# Patient Record
Sex: FEMALE | Race: WHITE | NOT HISPANIC OR LATINO | Employment: FULL TIME | URBAN - METROPOLITAN AREA
[De-identification: names, ages, dates, MRNs, and addresses within clinical notes are randomized per-mention and may not be internally consistent; named-entity substitution may affect disease eponyms.]

---

## 2020-07-15 ENCOUNTER — TELEMEDICINE CONVERTED (OUTPATIENT)
Dept: GASTROENTEROLOGY | Facility: CLINIC | Age: 35
End: 2020-07-15
Attending: PHYSICIAN ASSISTANT

## 2020-08-13 ENCOUNTER — HOSPITAL ENCOUNTER (OUTPATIENT)
Dept: PREADMISSION TESTING | Facility: HOSPITAL | Age: 35
Discharge: HOME | End: 2020-08-13
Attending: INTERNAL MEDICINE

## 2020-08-15 LAB — SARS-COV-2 RNA SPEC QL NAA+PROBE: NOT DETECTED

## 2020-08-17 ENCOUNTER — HOSPITAL ENCOUNTER (OUTPATIENT)
Dept: GASTROENTEROLOGY | Facility: HOSPITAL | Age: 35
Setting detail: HOSPITAL OUTPATIENT SURGERY
Discharge: HOME | End: 2020-08-17
Attending: INTERNAL MEDICINE

## 2020-08-17 LAB — HCG UR QL: NEGATIVE

## 2021-05-13 NOTE — PROGRESS NOTES
Progress Note      Patient Name: Mary Patel   Patient ID: [id number]   Sex: Female   YOB: 1985    Referring Provider: Margaret Laughlin MD    Visit Date: July 15, 2020    Provider: Damon Cummings PA-C   Location: [facility]   Location Address: [address] [place] [address]  [id number]   Location Phone: [phone]          History Of Present Illness  TELEHEALTH TELEPHONE VISIT  Chief Complaint: rectal bleeding   Mary Patel is a 35 year old female who is presenting for evaluation via telehealth telephone visit. Verbal consent obtained before beginning visit.   Provider spent 11 minutes with the patient during the telehealth visit.   The following staff were present during this visit: Antelmo Kapoor MA   Past Medical History/ Overview of Patient Symptoms       35-year-old female requested a telehealth video call through [facility] for rectal bleeding.  This is occurred for several years, with worsening symptoms in the last couple months.  She has bleeding with each bowel movements.  The bleeding is has bright red blood that fills the toilet bowl.  She has not tried any over-the-counter medications for symptoms.  She denies a previous colonoscopy.  She did have an EGD in  by Dr. Melendez showed H. pylori gastritis.       Past Medical History  Reviewed None Changed         Past Surgical History  ; Cholecystectomy; EGD         Medication List  Claritin 10 mg oral tablet; Proctozone-HC 2.5 % topical cream with perineal applicator         Allergy List  NO KNOWN DRUG ALLERGIES       Allergies Reconciled  Family Medical History  Family history of colon cancer         Social History  Tobacco (Current every day)         Immunizations  Reviewed None Changed         Review of Systems  · Constitutional  o Denies  o : fatigue, night sweats  · Eyes  o Denies  o : double vision, blurred vision  · HENT  o Denies  o : vertigo, recent head injury  · Breasts  o Denies  o : abnormal changes in  breast size, additional breast symptoms except as noted in the HPI  · Cardiovascular  o Denies  o : chest pain, irregular heart beats  · Respiratory  o Denies  o : shortness of breath, productive cough  · Gastrointestinal  o Denies  o : nausea, vomiting  · Genitourinary  o Denies  o : dysuria, urinary retention  · Integument  o Denies  o : hair growth change, new skin lesions  · Neurologic  o Denies  o : altered mental status, seizures  · Musculoskeletal  o Denies  o : joint swelling, limitation of motion  · Endocrine  o Denies  o : cold intolerance, heat intolerance  · Heme-Lymph  o Denies  o : petechiae, lymph node enlargement or tenderness  · Allergic-Immunologic  o Denies  o : frequent illnesses      Physical Examination     Hair normal, eyebrows normal, no nasal deformities, no lip deformities, no facial skin deformities, trachea midline, breathing unlabored, patient no apparent distress, alert and oriented, sclera anicteric.           Assessment  · Rectal bleeding     569.3/K62.5      Plan  · Orders  o CBC with [facility] ([id number]) - 569.3/K62.5 - 07/15/2020  · Medications  o Medications have been Reconciled  o Transition of Care or Provider Policy  · Instructions  o Plan Of Care: 35-year-old female with rectal bleeding and has previously never had a colonoscopy. I will schedule her for colonoscopy to evaluate for malignancy, hemorrhoids, AVMs, IBD, etc. I will add a trial of proctozone HC 2.5% in the interim. The patient is aware she is been tested for the coronavirus days prior to the procedure. I will order a CBC and she will call for results.            Electronically Signed by: Damon Cummings PA-C -Author on July 15, 2020 10:18:33 AM  Electronically Co-signed by: Jose Carlos Singh MD -Reviewer on July 20, 2020 09:11:49 PM

## 2021-08-11 ENCOUNTER — PREP FOR SURGERY (OUTPATIENT)
Dept: OTHER | Facility: HOSPITAL | Age: 36
End: 2021-08-11

## 2021-08-11 DIAGNOSIS — Z30.09 UNWANTED FERTILITY: ICD-10-CM

## 2021-08-11 DIAGNOSIS — N93.9 ABNORMAL UTERINE BLEEDING (AUB): Primary | ICD-10-CM

## 2021-08-11 RX ORDER — SODIUM CHLORIDE 0.9 % (FLUSH) 0.9 %
10 SYRINGE (ML) INJECTION AS NEEDED
Status: CANCELLED | OUTPATIENT
Start: 2021-08-11

## 2021-08-11 RX ORDER — SODIUM CHLORIDE 0.9 % (FLUSH) 0.9 %
3 SYRINGE (ML) INJECTION EVERY 12 HOURS SCHEDULED
Status: CANCELLED | OUTPATIENT
Start: 2021-08-11

## 2021-08-11 RX ORDER — SODIUM CHLORIDE, SODIUM LACTATE, POTASSIUM CHLORIDE, CALCIUM CHLORIDE 600; 310; 30; 20 MG/100ML; MG/100ML; MG/100ML; MG/100ML
125 INJECTION, SOLUTION INTRAVENOUS CONTINUOUS
Status: CANCELLED | OUTPATIENT
Start: 2021-08-11

## 2021-08-11 RX ORDER — ONDANSETRON 2 MG/ML
4 INJECTION INTRAMUSCULAR; INTRAVENOUS EVERY 6 HOURS PRN
Status: CANCELLED | OUTPATIENT
Start: 2021-08-11

## 2021-08-11 RX ORDER — CEFAZOLIN SODIUM 2 G/100ML
2 INJECTION, SOLUTION INTRAVENOUS ONCE
Status: CANCELLED | OUTPATIENT
Start: 2021-08-11 | End: 2021-08-11

## 2021-08-16 ENCOUNTER — PREP FOR SURGERY (OUTPATIENT)
Dept: OTHER | Facility: HOSPITAL | Age: 36
End: 2021-08-16

## 2021-08-20 ENCOUNTER — PRE-ADMISSION TESTING (OUTPATIENT)
Dept: PREADMISSION TESTING | Facility: HOSPITAL | Age: 36
End: 2021-08-20

## 2021-08-20 ENCOUNTER — LAB (OUTPATIENT)
Dept: LAB | Facility: HOSPITAL | Age: 36
End: 2021-08-20

## 2021-08-20 VITALS
RESPIRATION RATE: 16 BRPM | WEIGHT: 142.42 LBS | BODY MASS INDEX: 24.31 KG/M2 | HEART RATE: 66 BPM | HEIGHT: 64 IN | OXYGEN SATURATION: 95 % | TEMPERATURE: 98.4 F

## 2021-08-20 DIAGNOSIS — N93.9 ABNORMAL UTERINE BLEEDING: Primary | ICD-10-CM

## 2021-08-20 DIAGNOSIS — N93.9 ABNORMAL UTERINE BLEEDING (AUB): ICD-10-CM

## 2021-08-20 DIAGNOSIS — Z30.09 UNWANTED FERTILITY: ICD-10-CM

## 2021-08-20 LAB
ABO GROUP BLD: NORMAL
BASOPHILS # BLD AUTO: 0.04 10*3/MM3 (ref 0–0.2)
BASOPHILS NFR BLD AUTO: 0.6 % (ref 0–1.5)
DEPRECATED RDW RBC AUTO: 41.7 FL (ref 37–54)
EOSINOPHIL # BLD AUTO: 0.36 10*3/MM3 (ref 0–0.4)
EOSINOPHIL NFR BLD AUTO: 5.3 % (ref 0.3–6.2)
ERYTHROCYTE [DISTWIDTH] IN BLOOD BY AUTOMATED COUNT: 12 % (ref 12.3–15.4)
HCG INTACT+B SERPL-ACNC: <0.5 MIU/ML
HCT VFR BLD AUTO: 39 % (ref 34–46.6)
HGB BLD-MCNC: 13.3 G/DL (ref 12–15.9)
IMM GRANULOCYTES # BLD AUTO: 0.03 10*3/MM3 (ref 0–0.05)
IMM GRANULOCYTES NFR BLD AUTO: 0.4 % (ref 0–0.5)
LYMPHOCYTES # BLD AUTO: 1.7 10*3/MM3 (ref 0.7–3.1)
LYMPHOCYTES NFR BLD AUTO: 25.3 % (ref 19.6–45.3)
MCH RBC QN AUTO: 32 PG (ref 26.6–33)
MCHC RBC AUTO-ENTMCNC: 34.1 G/DL (ref 31.5–35.7)
MCV RBC AUTO: 93.8 FL (ref 79–97)
MONOCYTES # BLD AUTO: 0.68 10*3/MM3 (ref 0.1–0.9)
MONOCYTES NFR BLD AUTO: 10.1 % (ref 5–12)
NEUTROPHILS NFR BLD AUTO: 3.92 10*3/MM3 (ref 1.7–7)
NEUTROPHILS NFR BLD AUTO: 58.3 % (ref 42.7–76)
NRBC BLD AUTO-RTO: 0 /100 WBC (ref 0–0.2)
PLATELET # BLD AUTO: 265 10*3/MM3 (ref 140–450)
PMV BLD AUTO: 9.4 FL (ref 6–12)
RBC # BLD AUTO: 4.16 10*6/MM3 (ref 3.77–5.28)
RH BLD: POSITIVE
WBC # BLD AUTO: 6.73 10*3/MM3 (ref 3.4–10.8)

## 2021-08-20 PROCEDURE — 84702 CHORIONIC GONADOTROPIN TEST: CPT

## 2021-08-20 PROCEDURE — 36415 COLL VENOUS BLD VENIPUNCTURE: CPT

## 2021-08-20 PROCEDURE — 85025 COMPLETE CBC W/AUTO DIFF WBC: CPT

## 2021-08-20 PROCEDURE — U0003 INFECTIOUS AGENT DETECTION BY NUCLEIC ACID (DNA OR RNA); SEVERE ACUTE RESPIRATORY SYNDROME CORONAVIRUS 2 (SARS-COV-2) (CORONAVIRUS DISEASE [COVID-19]), AMPLIFIED PROBE TECHNIQUE, MAKING USE OF HIGH THROUGHPUT TECHNOLOGIES AS DESCRIBED BY CMS-2020-01-R: HCPCS

## 2021-08-20 PROCEDURE — 86900 BLOOD TYPING SEROLOGIC ABO: CPT

## 2021-08-20 PROCEDURE — 86901 BLOOD TYPING SEROLOGIC RH(D): CPT

## 2021-08-20 PROCEDURE — C9803 HOPD COVID-19 SPEC COLLECT: HCPCS

## 2021-08-20 NOTE — DISCHARGE INSTRUCTIONS
IMPORTANT INSTRUCTIONS - PRE-ADMISSION TESTING  1. DO NOT EAT OR CHEW anything after midnight the night before your procedure.    2. You may have CLEAR liquids up to _2_____ hours prior to ARRIVAL time.   3. Take the following medications the morning of your procedure with JUST A SIP OF WATER:  __________________________ALLERGY MED_____________________________________________________________________________________________________________________________________________________________    4. DO NOT BRING your medications to the hospital with you, UNLESS something has changed since your PRE-Admission Testing appointment.  5. Hold all vitamins, supplements, and NSAIDS (Non- steroidal anti-inflammatory meds) for one week prior to surgery (you MAY take Tylenol or Acetaminophen).  6. If you are diabetic, check your blood sugar the morning of your procedure. If it is less than 70 or if you are feeling symptomatic, call the following number for further instructions: [phone] SAME DAY SURGERY WILL CALL ARRIVAL TIME_____.  7. Use your inhalers/nebulizers as usual, the morning of your procedure. BRING YOUR INHALERS with you.NA   8. Bring your CPAP or BIPAP to hospital, ONLY IF YOU WILL BE SPENDING THE NIGHT. NA  9. Make sure you have a ride home and have someone who will stay with you the day of your procedure after you go home.  10. If you have any questions, please call your Pre-Admission Testing Nurse, ____________SELINA____ at [phone]__.   11. Per anesthesia request, do not smoke for 24 hours before your procedure or as instructed by your surgeon. !!!!!!!!  PREOPERATIVE (BEFORE SURGERY)              BATHING INSTRUCTIONS  Instructions:   • You will need to shower 1 time utilizing the soap provided; at the times indicated   below:     - AM  8/26 OR PM 8/25         • Wash your hair and face with normal shampoo and soap, rinse it well before using the surgical soap.     • In the shower, wet the skin  completely with water from your neck to your feet. Apply the cleanser to your   body ONLY FROM THE NECK TO YOUR FEET.    • Do NOT USE THE CLEANSER ON YOUR FACE, HEAD, OR GENITAL (PRIVATE) AREAS.   Keep it out of your eyes, ears, and mouth because of the risk of injury to those areas.     • Scrub with a clean washcloth for each bath utilizing the soap provided from the top of your body to the   bottom starting at the neck area.     • Pay close attention to your armpits, groin area, and the site of surgery.     • Wash your body gently for 5 minutes. Stand outside the stream or turn off the water while scrubbing your   body. Do NOT wash with your regular soap after the surgical cleanser is used.     • RINSE THE CLEANSER OFF COMPLETELY with plenty of water. Rinse the area again thoroughly.     • Dry off with a clean towel. The surgical soap can cause dryness; however do NOT APPLY LOTION,   CREAM, POWDER, and/or DEODORANT AFTER SHOWERING.    • Be sure to where clean clothes after showering.     • Ensure CLEAN BED LINENS AFTER FIRST wash with the surgical soap.     • NO PETS ALLOWED IN THE BED with you after utilizing the surgical soap.

## 2021-08-23 LAB — SARS-COV-2 RNA RESP QL NAA+PROBE: NOT DETECTED

## 2021-08-25 ENCOUNTER — PREP FOR SURGERY (OUTPATIENT)
Dept: OTHER | Facility: HOSPITAL | Age: 36
End: 2021-08-25

## 2021-08-25 DIAGNOSIS — N93.9 ABNORMAL UTERINE BLEEDING (AUB): Primary | ICD-10-CM

## 2021-08-25 DIAGNOSIS — Z30.09 UNWANTED FERTILITY: ICD-10-CM

## 2021-08-26 ENCOUNTER — ANESTHESIA (OUTPATIENT)
Dept: PERIOP | Facility: HOSPITAL | Age: 36
End: 2021-08-26

## 2021-08-26 ENCOUNTER — HOSPITAL ENCOUNTER (OUTPATIENT)
Facility: HOSPITAL | Age: 36
Setting detail: HOSPITAL OUTPATIENT SURGERY
Discharge: HOME | End: 2021-08-26
Attending: OBSTETRICS & GYNECOLOGY | Admitting: OBSTETRICS & GYNECOLOGY

## 2021-08-26 ENCOUNTER — ANESTHESIA EVENT (OUTPATIENT)
Dept: PERIOP | Facility: HOSPITAL | Age: 36
End: 2021-08-26

## 2021-08-26 VITALS
SYSTOLIC BLOOD PRESSURE: 112 MMHG | OXYGEN SATURATION: 97 % | BODY MASS INDEX: 23.51 KG/M2 | DIASTOLIC BLOOD PRESSURE: 57 MMHG | HEIGHT: 65 IN | TEMPERATURE: 98.4 F | RESPIRATION RATE: 16 BRPM | WEIGHT: 141.09 LBS | HEART RATE: 60 BPM

## 2021-08-26 DIAGNOSIS — N93.9 ABNORMAL UTERINE BLEEDING (AUB): ICD-10-CM

## 2021-08-26 DIAGNOSIS — Z30.09 UNWANTED FERTILITY: Primary | ICD-10-CM

## 2021-08-26 PROBLEM — N73.6 PELVIC ADHESIONS: Status: ACTIVE | Noted: 2021-08-26

## 2021-08-26 LAB
ABO GROUP BLD: NORMAL
BLD GP AB SCN SERPL QL: NEGATIVE
RH BLD: POSITIVE
T&S EXPIRATION DATE: NORMAL

## 2021-08-26 PROCEDURE — 88342 IMHCHEM/IMCYTCHM 1ST ANTB: CPT | Performed by: OBSTETRICS & GYNECOLOGY

## 2021-08-26 PROCEDURE — 25010000002 FENTANYL CITRATE (PF) 50 MCG/ML SOLUTION: Performed by: NURSE ANESTHETIST, CERTIFIED REGISTERED

## 2021-08-26 PROCEDURE — 25010000002 PROPOFOL 10 MG/ML EMULSION: Performed by: NURSE ANESTHETIST, CERTIFIED REGISTERED

## 2021-08-26 PROCEDURE — 25010000002 ONDANSETRON PER 1 MG: Performed by: NURSE ANESTHETIST, CERTIFIED REGISTERED

## 2021-08-26 PROCEDURE — 25010000002 NEOSTIGMINE 10 MG/10ML SOLUTION: Performed by: NURSE ANESTHETIST, CERTIFIED REGISTERED

## 2021-08-26 PROCEDURE — 25010000002 DEXAMETHASONE PER 1 MG: Performed by: NURSE ANESTHETIST, CERTIFIED REGISTERED

## 2021-08-26 PROCEDURE — 25010000003 CEFAZOLIN IN DEXTROSE 2-4 GM/100ML-% SOLUTION: Performed by: OBSTETRICS & GYNECOLOGY

## 2021-08-26 PROCEDURE — 86900 BLOOD TYPING SEROLOGIC ABO: CPT | Performed by: OBSTETRICS & GYNECOLOGY

## 2021-08-26 PROCEDURE — 86901 BLOOD TYPING SEROLOGIC RH(D): CPT | Performed by: OBSTETRICS & GYNECOLOGY

## 2021-08-26 PROCEDURE — 25010000002 HYDROMORPHONE 1 MG/ML SOLUTION: Performed by: NURSE ANESTHETIST, CERTIFIED REGISTERED

## 2021-08-26 PROCEDURE — 86850 RBC ANTIBODY SCREEN: CPT | Performed by: OBSTETRICS & GYNECOLOGY

## 2021-08-26 PROCEDURE — 25010000002 MIDAZOLAM PER 1MG: Performed by: ANESTHESIOLOGY

## 2021-08-26 PROCEDURE — 25010000002 KETOROLAC TROMETHAMINE PER 15 MG: Performed by: NURSE ANESTHETIST, CERTIFIED REGISTERED

## 2021-08-26 PROCEDURE — 25010000003 MEPERIDINE PER 100 MG: Performed by: NURSE ANESTHETIST, CERTIFIED REGISTERED

## 2021-08-26 PROCEDURE — 88305 TISSUE EXAM BY PATHOLOGIST: CPT | Performed by: OBSTETRICS & GYNECOLOGY

## 2021-08-26 RX ORDER — CEFAZOLIN SODIUM 2 G/100ML
2 INJECTION, SOLUTION INTRAVENOUS ONCE
Status: COMPLETED | OUTPATIENT
Start: 2021-08-26 | End: 2021-08-26

## 2021-08-26 RX ORDER — ONDANSETRON 2 MG/ML
4 INJECTION INTRAMUSCULAR; INTRAVENOUS ONCE AS NEEDED
Status: DISCONTINUED | OUTPATIENT
Start: 2021-08-26 | End: 2021-08-26 | Stop reason: HOSPADM

## 2021-08-26 RX ORDER — ONDANSETRON 2 MG/ML
4 INJECTION INTRAMUSCULAR; INTRAVENOUS EVERY 6 HOURS PRN
Status: DISCONTINUED | OUTPATIENT
Start: 2021-08-26 | End: 2021-08-26 | Stop reason: HOSPADM

## 2021-08-26 RX ORDER — MIDAZOLAM HYDROCHLORIDE 2 MG/2ML
2 INJECTION, SOLUTION INTRAMUSCULAR; INTRAVENOUS ONCE
Status: COMPLETED | OUTPATIENT
Start: 2021-08-26 | End: 2021-08-26

## 2021-08-26 RX ORDER — DEXAMETHASONE SODIUM PHOSPHATE 4 MG/ML
INJECTION, SOLUTION INTRA-ARTICULAR; INTRALESIONAL; INTRAMUSCULAR; INTRAVENOUS; SOFT TISSUE AS NEEDED
Status: DISCONTINUED | OUTPATIENT
Start: 2021-08-26 | End: 2021-08-26 | Stop reason: SURG

## 2021-08-26 RX ORDER — MAGNESIUM HYDROXIDE 1200 MG/15ML
LIQUID ORAL AS NEEDED
Status: DISCONTINUED | OUTPATIENT
Start: 2021-08-26 | End: 2021-08-26 | Stop reason: HOSPADM

## 2021-08-26 RX ORDER — HYDROCODONE BITARTRATE AND ACETAMINOPHEN 5; 325 MG/1; MG/1
1-2 TABLET ORAL EVERY 6 HOURS PRN
Qty: 16 TABLET | Refills: 0 | Status: SHIPPED | OUTPATIENT
Start: 2021-08-26 | End: 2021-11-04

## 2021-08-26 RX ORDER — LIDOCAINE HYDROCHLORIDE 20 MG/ML
INJECTION, SOLUTION INFILTRATION; PERINEURAL AS NEEDED
Status: DISCONTINUED | OUTPATIENT
Start: 2021-08-26 | End: 2021-08-26 | Stop reason: SURG

## 2021-08-26 RX ORDER — MEPERIDINE HYDROCHLORIDE 25 MG/ML
INJECTION INTRAMUSCULAR; INTRAVENOUS; SUBCUTANEOUS
Status: DISCONTINUED
Start: 2021-08-26 | End: 2021-08-26 | Stop reason: HOSPADM

## 2021-08-26 RX ORDER — ONDANSETRON 2 MG/ML
INJECTION INTRAMUSCULAR; INTRAVENOUS AS NEEDED
Status: DISCONTINUED | OUTPATIENT
Start: 2021-08-26 | End: 2021-08-26 | Stop reason: SURG

## 2021-08-26 RX ORDER — ACETAMINOPHEN 500 MG
1000 TABLET ORAL ONCE
Status: COMPLETED | OUTPATIENT
Start: 2021-08-26 | End: 2021-08-26

## 2021-08-26 RX ORDER — KETOROLAC TROMETHAMINE 30 MG/ML
INJECTION, SOLUTION INTRAMUSCULAR; INTRAVENOUS AS NEEDED
Status: DISCONTINUED | OUTPATIENT
Start: 2021-08-26 | End: 2021-08-26 | Stop reason: SURG

## 2021-08-26 RX ORDER — PROMETHAZINE HYDROCHLORIDE 12.5 MG/1
25 TABLET ORAL ONCE AS NEEDED
Status: DISCONTINUED | OUTPATIENT
Start: 2021-08-26 | End: 2021-08-26 | Stop reason: HOSPADM

## 2021-08-26 RX ORDER — MEPERIDINE HYDROCHLORIDE 25 MG/ML
12.5 INJECTION INTRAMUSCULAR; INTRAVENOUS; SUBCUTANEOUS
Status: DISCONTINUED | OUTPATIENT
Start: 2021-08-26 | End: 2021-08-26 | Stop reason: HOSPADM

## 2021-08-26 RX ORDER — SODIUM CHLORIDE, SODIUM LACTATE, POTASSIUM CHLORIDE, CALCIUM CHLORIDE 600; 310; 30; 20 MG/100ML; MG/100ML; MG/100ML; MG/100ML
125 INJECTION, SOLUTION INTRAVENOUS CONTINUOUS
Status: DISCONTINUED | OUTPATIENT
Start: 2021-08-26 | End: 2021-08-26 | Stop reason: HOSPADM

## 2021-08-26 RX ORDER — SODIUM CHLORIDE, SODIUM LACTATE, POTASSIUM CHLORIDE, CALCIUM CHLORIDE 600; 310; 30; 20 MG/100ML; MG/100ML; MG/100ML; MG/100ML
9 INJECTION, SOLUTION INTRAVENOUS CONTINUOUS PRN
Status: DISCONTINUED | OUTPATIENT
Start: 2021-08-26 | End: 2021-08-26 | Stop reason: HOSPADM

## 2021-08-26 RX ORDER — ROCURONIUM BROMIDE 10 MG/ML
INJECTION, SOLUTION INTRAVENOUS AS NEEDED
Status: DISCONTINUED | OUTPATIENT
Start: 2021-08-26 | End: 2021-08-26 | Stop reason: SURG

## 2021-08-26 RX ORDER — PROPOFOL 10 MG/ML
VIAL (ML) INTRAVENOUS AS NEEDED
Status: DISCONTINUED | OUTPATIENT
Start: 2021-08-26 | End: 2021-08-26 | Stop reason: SURG

## 2021-08-26 RX ORDER — IBUPROFEN 600 MG/1
600 TABLET ORAL EVERY 6 HOURS PRN
Qty: 60 TABLET | Refills: 1 | Status: SHIPPED | OUTPATIENT
Start: 2021-08-26 | End: 2021-11-04

## 2021-08-26 RX ORDER — SODIUM CHLORIDE 0.9 % (FLUSH) 0.9 %
10 SYRINGE (ML) INJECTION AS NEEDED
Status: DISCONTINUED | OUTPATIENT
Start: 2021-08-26 | End: 2021-08-26 | Stop reason: HOSPADM

## 2021-08-26 RX ORDER — SODIUM CHLORIDE 0.9 % (FLUSH) 0.9 %
3 SYRINGE (ML) INJECTION EVERY 12 HOURS SCHEDULED
Status: DISCONTINUED | OUTPATIENT
Start: 2021-08-26 | End: 2021-08-26 | Stop reason: HOSPADM

## 2021-08-26 RX ORDER — FENTANYL CITRATE 50 UG/ML
INJECTION, SOLUTION INTRAMUSCULAR; INTRAVENOUS AS NEEDED
Status: DISCONTINUED | OUTPATIENT
Start: 2021-08-26 | End: 2021-08-26 | Stop reason: SURG

## 2021-08-26 RX ORDER — IBUPROFEN 600 MG/1
600 TABLET ORAL EVERY 6 HOURS PRN
Status: DISCONTINUED | OUTPATIENT
Start: 2021-08-26 | End: 2021-08-26 | Stop reason: HOSPADM

## 2021-08-26 RX ORDER — OXYCODONE HYDROCHLORIDE 5 MG/1
5 TABLET ORAL
Status: DISCONTINUED | OUTPATIENT
Start: 2021-08-26 | End: 2021-08-26 | Stop reason: HOSPADM

## 2021-08-26 RX ORDER — NEOSTIGMINE METHYLSULFATE 1 MG/ML
INJECTION, SOLUTION INTRAVENOUS AS NEEDED
Status: DISCONTINUED | OUTPATIENT
Start: 2021-08-26 | End: 2021-08-26 | Stop reason: SURG

## 2021-08-26 RX ORDER — BUPIVACAINE HYDROCHLORIDE AND EPINEPHRINE 5; 5 MG/ML; UG/ML
INJECTION, SOLUTION EPIDURAL; INTRACAUDAL; PERINEURAL AS NEEDED
Status: DISCONTINUED | OUTPATIENT
Start: 2021-08-26 | End: 2021-08-26 | Stop reason: HOSPADM

## 2021-08-26 RX ORDER — GLYCOPYRROLATE 0.2 MG/ML
INJECTION INTRAMUSCULAR; INTRAVENOUS AS NEEDED
Status: DISCONTINUED | OUTPATIENT
Start: 2021-08-26 | End: 2021-08-26 | Stop reason: SURG

## 2021-08-26 RX ORDER — PROMETHAZINE HYDROCHLORIDE 25 MG/1
25 SUPPOSITORY RECTAL ONCE AS NEEDED
Status: DISCONTINUED | OUTPATIENT
Start: 2021-08-26 | End: 2021-08-26 | Stop reason: HOSPADM

## 2021-08-26 RX ADMIN — OXYCODONE HYDROCHLORIDE 5 MG: 5 TABLET ORAL at 11:48

## 2021-08-26 RX ADMIN — MEPERIDINE HYDROCHLORIDE 12.5 MG: 25 INJECTION INTRAMUSCULAR; INTRAVENOUS; SUBCUTANEOUS at 11:11

## 2021-08-26 RX ADMIN — DEXAMETHASONE SODIUM PHOSPHATE 4 MG: 4 INJECTION INTRA-ARTICULAR; INTRALESIONAL; INTRAMUSCULAR; INTRAVENOUS; SOFT TISSUE at 10:11

## 2021-08-26 RX ADMIN — ACETAMINOPHEN 1000 MG: 500 TABLET ORAL at 08:49

## 2021-08-26 RX ADMIN — KETOROLAC TROMETHAMINE 30 MG: 30 INJECTION, SOLUTION INTRAMUSCULAR; INTRAVENOUS at 10:50

## 2021-08-26 RX ADMIN — FENTANYL CITRATE 100 MCG: 50 INJECTION INTRAMUSCULAR; INTRAVENOUS at 09:58

## 2021-08-26 RX ADMIN — ONDANSETRON 4 MG: 2 INJECTION INTRAMUSCULAR; INTRAVENOUS at 10:50

## 2021-08-26 RX ADMIN — PROPOFOL 200 MG: 10 INJECTION, EMULSION INTRAVENOUS at 09:58

## 2021-08-26 RX ADMIN — LIDOCAINE HYDROCHLORIDE 100 MG: 20 INJECTION, SOLUTION INFILTRATION; PERINEURAL at 09:58

## 2021-08-26 RX ADMIN — NEOSTIGMINE METHYLSULFATE 3 MG: 1 INJECTION, SOLUTION INTRAVENOUS at 10:52

## 2021-08-26 RX ADMIN — HYDROMORPHONE HYDROCHLORIDE 0.5 MG: 1 INJECTION, SOLUTION INTRAMUSCULAR; INTRAVENOUS; SUBCUTANEOUS at 11:36

## 2021-08-26 RX ADMIN — HYDROMORPHONE HYDROCHLORIDE 0.5 MG: 1 INJECTION, SOLUTION INTRAMUSCULAR; INTRAVENOUS; SUBCUTANEOUS at 11:24

## 2021-08-26 RX ADMIN — SODIUM CHLORIDE, POTASSIUM CHLORIDE, SODIUM LACTATE AND CALCIUM CHLORIDE 9 ML/HR: 600; 310; 30; 20 INJECTION, SOLUTION INTRAVENOUS at 11:49

## 2021-08-26 RX ADMIN — MIDAZOLAM HYDROCHLORIDE 2 MG: 1 INJECTION, SOLUTION INTRAMUSCULAR; INTRAVENOUS at 09:24

## 2021-08-26 RX ADMIN — ROCURONIUM BROMIDE 40 MG: 10 INJECTION INTRAVENOUS at 09:58

## 2021-08-26 RX ADMIN — CEFAZOLIN SODIUM 2 G: 2 INJECTION, SOLUTION INTRAVENOUS at 10:00

## 2021-08-26 RX ADMIN — GLYCOPYRROLATE 0.4 MCG: 0.2 INJECTION INTRAMUSCULAR; INTRAVENOUS at 10:52

## 2021-08-26 RX ADMIN — FENTANYL CITRATE 50 MCG: 50 INJECTION INTRAMUSCULAR; INTRAVENOUS at 10:23

## 2021-08-26 RX ADMIN — FENTANYL CITRATE 50 MCG: 50 INJECTION INTRAMUSCULAR; INTRAVENOUS at 10:18

## 2021-08-26 RX ADMIN — SODIUM CHLORIDE, POTASSIUM CHLORIDE, SODIUM LACTATE AND CALCIUM CHLORIDE 9 ML/HR: 600; 310; 30; 20 INJECTION, SOLUTION INTRAVENOUS at 08:41

## 2021-08-26 NOTE — OP NOTE
SALPINGECTOMY LAPAROSCOPIC, DILATATION AND CURETTAGE HYSTEROSCOPY NOVASURE ENDOMETRIAL ABLATION  Procedure Report    Patient Name:  Mary Patel  YOB: 1985    Date of Surgery:  8/26/2021     Pre-op Diagnosis:   Unwanted fertility [Z30.09]  Abnormal uterine bleeding (AUB) [N93.9]       Post-Op Diagnosis Codes:     * Unwanted fertility [Z30.09]     * Abnormal uterine bleeding (AUB) [N93.9]     * Pelvic adhesions [N73.6]    Procedure/CPT® Codes:      Procedure(s):  LYSIS OF ADHESIONS, SALPINGECTOMY LAPAROSCOPIC  DILATATION AND CURETTAGE HYSTEROSCOPY NOVASURE ENDOMETRIAL ABLATION    Staff:  Surgeon(s):  Jeferson Stark MD    Assistant: Judy Porter RN CSA    Anesthesia: General    Estimated Blood Loss: 20 mL      Specimen:          Specimens     ID Source Type Tests Collected By Collected At Frozen?    A Endometrial Curettings Tissue · TISSUE PATHOLOGY EXAM   Jeferson Stark MD 8/26/21 1024 No    B Fallopian Tubes, Bilateral Tissue · TISSUE PATHOLOGY EXAM   Jeferson Stark MD 8/26/21 1041 No              Findings: The uterine cavity was normal without evidence of submucous myoma, congenital anomaly, or endometrial polyps.  There was omentum adhered to the anterior abdominal wall from just below the umbilicus all the way down to the uterine fundus.  The anterior uterine surface was adhered to the anterior abdominal wall.  The fallopian tubes and ovaries were normal.  The posterior cul-de-sac was normal.  There is no obvious endometriosis.    Complications: None    Description of Procedure: After reviewing the informed consent including the risks benefits and alternatives to the procedure, the patient expressed her understanding and desire to proceed.  She was taken to the operating room with an IV in place and running.  She was placed on the operating table in the dorsal supine position.  The patient was given a general anesthetic, and intubated endotracheally.  The  patient was then repositioned into the dorsal lithotomy position.  Her arms were tucked to the side, and her legs were placed in yellow-fin stirrups.  We took care in positioning both the arms and legs, padding any potential pressure points.  The bladder was drained in a sterile fashion with an in and out catheterization. After a surgical time-out was performed, the patient was prepped and draped in the usual sterile fashion.  I placed a sponge stick in the vagina.  I then changed my gloves, and proceeded to the abdomen.    After ensuring the patient was flat, and had an orogastric tube in place, I infiltrated in the planned incision sites with a total of 10mL of half percent Marcaine with epinephrine.  I made a horizontal infraumbilical skin incision with the scalpel.  With anterior traction on the anterior abdominal wall I inserted the Veress needle into the peritoneal cavity without difficulty.  Free-flowing saline through the Veress needle confirmed my intraperitoneal location.  Opening pressures were less than 7 mm of mercury.  The abdomen was insufflated to 25 mm of mercury. I then used a 5 mm Optiview trocar to enter the peritoneal cavity under direct visualization.      I then surveyed the area below the trocar insertion site, and there was no evidence of any bowel or vascular injury.  It was obvious there was omentum adhered to the anterior abdominal wall down the midline of the pelvis.  At this time I placed an 5 mm trocar in the left lower quadrant.  The intra-abdominal pressure were reduced to 15 mm of mercury.  The patient was placed in Trendelenburg position, and the bowel was evacuated out of the pelvis with a blunt probe.    The omentum was adhered just below the umbilicus all the way down to the fundus of the uterus.  The uterus was normal size, the fallopian tubes and ovaries were normal bilaterally.  The uterus was adhered to the anterior abdominal wall as well.  The posterior cul-de-sac was free.   There was no evidence of any endometriosis.  I called for the LigaSure device.  Carefully took down the omental adhesions, which did not contain any bowel.  I took these down all the way to the uterus.  I did not attempt to lyse the adhesions of the uterus to the anterior abdominal wall, as I felt this would create significant bleeding on the anterior uterine surface.  I then proceeded with a salpingectomy.  I tented up the right fallopian tube, then using the LigaSure sealed and divided the distal end of the mesosalpinx just between the ovary and fallopian tube.  I carried this plane of dissection proximally just underneath the fallopian tube to the cornual region of the uterus.  I then amputated the fallopian tube, and extracted through the umbilical trocar.  I carried out the same procedure on the left side, completing the salpingectomy.  The operative pressures were lowered to 5 mm mercury. I surveyed my operative tissue pedicles, and there was excellent hemostasis.      This complete the laparoscopic portion of my procedure.   The right lower and left lower quadrant trochars were removed.  The seat to the umbilical trocar was removed, allowing complete evacuation of the pneumoperitoneum in patient's abdomen. The trocar cannula was then removed.  The skin sites were then reapproximated with simple, interrupted, subcuticular 4-0 Monocryl sutures.  The incisions were then reinforced with Mastisol and Steri-Strips, and covered with a coverlet.      I proceeded below for the initial ablation.  The sponge stick was removed from the vagina. I inserted a Vergara retractor into the posterior vagina, and a Nahant retractor into the anterior vagina. I grasped the cervix with the single tooth tenaculum.  I carried out my paracervical block with 10 ml of 0.5% Marcaine with Epinephrine. I introduced a 5 mm 30 degree diagnostic hysteroscope, with normal saline as my distension media, through the external cervical os and into  the uterine cavity without difficulty.      I surveyed the uterine cavity.  The uterine cavity appeared normal, without evidence of endometrial polyp, submucous myoma, or congenital anomaly.  The hysteroscope was removed.  The uterus was sounded to 10 cm.  Using a Hegar dilator, the cervix were estimated to be 5.5 cm.  The cavity length thus, was 4.5 cm.  The cervix was dilated to 6 mm.  I performed a systematic sharp curettage of the entire endometrium. This specimen was passed off as endometrial curettings.  I re-introduced the hysteroscope. There was a good curettage of the entire endometrium, and no evidence of any uterine injury. The hysteroscope was removed.  The NovaSure ablation device was called for.  The device array was deployed, and I had a uterine with greater than 2.5 cm.  The array was retracted back into the sleeve.  The NovaSure device was inserted through the external os, and gently to the fundus of the uterus, using a bow and arrow technique.  The array was then again deployed, and the device seated, per the 's recommendations.  The uterine width was 3.2 cm.  The power was determined by the device to be 79 Cummings.  The cervical collar was advanced against the cervix.  The cavity assessment was carried out, and passed.  The device was enabled, and the ablation initiated.  The ablation cycle carried out for 1 minute 12 seconds.  At the end of the ablation cycle, the array was retracted back into the NovaSure device a sleeve, using the bow and arrow technique.  The hysteroscope was reintroduced, and the cavity surveyed.  There was a good global ablation, without unintended injury or uterine perforation.  The hysteroscope was removed.  I removed the tenaculum.  The tenaculum sites were hemostatic. The other instruments were removed.  Normal saline was used as a uterine distention media, and there was no significant fluid deficit. This completed the my procedure.    The patient was taken out  of lithotomy position, awoken from her general anesthesia, and taken to the recovery room in satisfactory condition.  All counts were correct x2, and the patient received [default value]as her preoperative antibiotics.  The patient will be discharged home which she meets PACU criteria.  The patient will follow up with me in approximately 2 weeks, and this appointment will be made for her prior to discharge.  For discharge medications please see the medication reconciliation page.  The patient was instructed to call the office for any of the following: Temperature greater than 100°F, shortness of breath or chest pain, pain that is worsening despite current pain medications, excessive nausea or vomiting, redness swelling or drainage from the incisions, heavy vaginal bleeding, or other concerns.    Assistant: Judy Porter RN CSA  was responsible for performing the following activities: Retraction, Placing Dressing and Held/Positioned Camera and their skilled assistance was necessary for the success of this case.    Jeferson Stark MD     Date: 8/26/2021  Time: 12:43 EDT

## 2021-08-26 NOTE — ANESTHESIA PROCEDURE NOTES
Airway  Urgency: elective    Date/Time: 8/26/2021 10:05 AM  Airway not difficult    General Information and Staff    Patient location during procedure: OR    Indications and Patient Condition  Indications for airway management: airway protection    Preoxygenated: yes  Mask difficulty assessment: 1 - vent by mask    Final Airway Details  Final airway type: endotracheal airway      Successful airway: ETT  Cuffed: yes   Successful intubation technique: direct laryngoscopy  Endotracheal tube insertion site: oral  Blade: Bernard  Blade size: 2  ETT size (mm): 7.0  Cormack-Lehane Classification: grade I - full view of glottis  Placement verified by: chest auscultation and capnometry   Number of attempts at approach: 1  Assessment: lips, teeth, and gum same as pre-op and atraumatic intubation

## 2021-08-26 NOTE — ANESTHESIA PREPROCEDURE EVALUATION
Anesthesia Evaluation     Patient summary reviewed and Nursing notes reviewed   no history of anesthetic complications:  NPO Solid Status: > 8 hours  NPO Liquid Status: > 2 hours           Airway   Mallampati: II  TM distance: >3 FB  Neck ROM: full  No difficulty expected  Dental      Pulmonary - negative pulmonary ROS and normal exam    breath sounds clear to auscultation  Cardiovascular - negative cardio ROS and normal exam  Exercise tolerance: good (4-7 METS)    Rhythm: regular        Neuro/Psych  (+) headaches,     GI/Hepatic/Renal/Endo    (+)  GERD,      Musculoskeletal (-) negative ROS    Abdominal    Substance History - negative use     OB/GYN negative ob/gyn ROS         Other - negative ROS                       Anesthesia Plan    ASA 2     general   (Patient understands anesthesia not responsible for dental damage.)  intravenous induction     Anesthetic plan, all risks, benefits, and alternatives have been provided, discussed and informed consent has been obtained with: patient.  Use of blood products discussed with patient .   Plan discussed with CRNA.

## 2021-08-26 NOTE — DISCHARGE INSTRUCTIONS
DISCHARGE INSTRUCTIONS  GYNECOLOGICAL  PROCEDURES      ? For your surgery you had:  ? General anesthesia (you may have a sore throat for the first 24 hours)  ? You received a medicated patch for nausea prevention today (behind your ear). It is recommended that you remove it 24-48 hours post-operatively. It must be removed within 72 hours.  ? You received an anesthesia medication today that can cause hormonal forms of birth control to be ineffective. You should use a different form of birth control (to prevent pregnancy) for 7 days.   ? You may experience dizziness, drowsiness, or lightheadedness for several hours following surgery.  ? Do not stay alone today or tonight.  ? Limit your activity for 24 hours.  ? Resume your diet slowly.  Follow any special dietary instructions you may have been given by your doctor.  ? You should not drive or operate machinery, drink alcohol, or sign legally binding documents for 24 hours or while you are taking pain medication.  Last dose of pain medication was given at:   NOTIFY YOUR DOCTOR IF YOU EXPERIENCE ANY OF THE FOLLOWING:  ? Temperature greater than 101 degrees Fahrenheit  ? Shaking Chills  ? Redness or excessive drainage from incision  ? Nausea, vomiting and/or pain that is not controlled by prescribed medications  ? Increase in bleeding or bleeding that is excessive  ? Unable to urinate in 6 hours after surgery  ? If unable to reach your doctor, please go to the closest Emergency Room [x] Remove dressing in:  48 HOURS     [x] Nothing in the vagina for 2 weeks to include intercourse, douches, or tampons.  [x] Vaginal bleeding may be expected for several days with flow decreasing with time and never any heavier than a normal   period.  ? If you have an ablation, vaginal discharge is expected after bleeding stops.   ? If you have foul smelling discharge, notify your physician.  ? Medications per physician instructions as indicated on Discharge Medication Information  Sheet.    SPECIAL INSTRUCTIONS:   NO TUB BATHS UNTIL AFTER FOLLOW UP

## 2021-08-26 NOTE — ANESTHESIA POSTPROCEDURE EVALUATION
Patient: Mary Patel    Procedure Summary     Date: 08/26/21 Room / Location: [facility] OR 02 / [facility] MAIN OR    Anesthesia Start: 0954 Anesthesia Stop: 1101    Procedures:       LYSIS OF ADHESIONS, SALPINGECTOMY LAPAROSCOPIC (Bilateral Abdomen)      DILATATION AND CURETTAGE HYSTEROSCOPY NOVASURE ENDOMETRIAL ABLATION (N/A Vagina) Diagnosis:       Unwanted fertility      Abnormal uterine bleeding (AUB)      (Unwanted fertility [Z30.09])      (Abnormal uterine bleeding (AUB) [N93.9])    Surgeons: Jeferson Stark MD Provider: Renee Morales DO    Anesthesia Type: general ASA Status: 2          Anesthesia Type: general    Vitals  Vitals Value Taken Time   /66 08/26/21 1119   Temp     Pulse 52 08/26/21 1121   Resp     SpO2 100 % 08/26/21 1121   Vitals shown include unvalidated device data.        Post Anesthesia Care and Evaluation    Patient location during evaluation: bedside  Patient participation: complete - patient participated  Level of consciousness: awake  Pain management: adequate  Airway patency: patent  Anesthetic complications: No anesthetic complications  PONV Status: none  Cardiovascular status: acceptable and stable  Respiratory status: acceptable  Hydration status: acceptable    Comments: An Anesthesiologist personally participated in the most demanding procedures (including induction and emergence if applicable) in the anesthesia plan, monitored the course of anesthesia administration at frequent intervals and remained physically present and available for immediate diagnosis and treatment of emergencies.

## 2021-08-26 NOTE — H&P
[facility]   HISTORY AND PHYSICAL    Patient Name: Mary Patel  : 1985  MRN: [id number]  Primary Care Physician:  Provider, No Known  Date of admission: 21    Subjective   Subjective     Chief Complaint: Here for surgery    HPI:    Mary Patel is a 36 y.o. female with a history significant for heavy menstrual flow 2 times per month lasting 7 to 14 days each time she had a menstrual flow.  She reports pelvic cramping with her menstrual flow.  She has tried medical therapy had no improvement in her symptoms at this point she desires no future childbearing and is interested in permanent sterilization.  Given her failure of medical therapy discussed surgical treatment of her abnormal uterine bleeding.  After reviewing the different options for further medical management and surgical management patient wished to proceed with hysteroscopy dilatation curettage and NovaSure endometrial ablation.  She also wished to have a bilateral salpingectomy for sterilization at time of her endometrial ablation.    Review of Systems   All systems were reviewed and negative except for: Heavy menses, frequent menses, painful menses, prolonged menses    Personal History     Past Medical History:   Diagnosis Date   • Colon polyps    • GERD (gastroesophageal reflux disease)    • Migraine        Past Surgical History:   Procedure Laterality Date   • CHOLECYSTECTOMY     • COLONOSCOPY     • ENDOSCOPY         Family History: family history is not on file. Otherwise pertinent FHx was reviewed and not pertinent to current issue.    Social History:  reports that she has been smoking cigarettes. She has been smoking about 0.50 packs per day. She has never used smokeless tobacco. She reports that she does not drink alcohol and does not use drugs.    Home Medications:  SUMAtriptan and cetirizine-pseudoephedrine      Allergies:  Allergies   Allergen Reactions   • Morphine Anaphylaxis       Objective   Objective     Vitals:  Blood pressure 116/75 pulse 76 weight 141 pounds     Physical Exam    Constitutional: Awake, alert   Eyes: PERRLA, sclerae anicteric, no conjunctival injection   HENT: NCAT, mucous membranes moist   Neck: Supple, no thyromegaly, no lymphadenopathy, trachea midline   Respiratory: Clear to auscultation bilaterally, nonlabored respirations    Cardiovascular: RRR, no murmurs, rubs, or gallops, palpable pedal pulses bilaterally   Gastrointestinal: Positive bowel sounds, soft, nontender, nondistended              Genitourinary: Normal external genitalia, vagina, and cervix.  The uterus is approximately 10 cm mobile nontender with no palpable uterine or adnexal masses the uterus is anteverted.   Musculoskeletal: No bilateral ankle edema, no clubbing or cyanosis to extremities   Psychiatric: Appropriate affect, cooperative   Neurologic: Oriented x 3, strength symmetric in all extremities, speech clear   Skin: No rashes     Result Review    Result Review:  I have personally reviewed the results from the time of this admission to 8/25/2021 21:41 EDT and agree with these findings:  [x]  Laboratory  []  Microbiology  [x]  Radiology  []  EKG/Telemetry   []  Cardiology/Vascular   [x]  Pathology  [x]  Old records  []  Other:      Assessment/Plan   Assessment / Plan     Assessment:  Abnormal uterine bleeding  Unwanted fertility    Plan:   Given the patient's failure of medical therapy she wishes to proceed with surgical therapy.  She has declined hysteroscopy dilatation curettage. She has also declined hysterectomy.  She wishes to proceed with hysteroscopy, dilatation curettage, endometrial ablation.  She is also expresses desire for no ability for future childbearing.  She wishes to undergo permanent sterilization at time of her initial ablation.  The risks, benefits, and alternatives to hysteroscopy, dilatation curettage, and endometrial ablation have been reviewed the patient.  The risks included, but not limited to, infection,  bleeding, hemorrhage, blood transfusion. Injury to nearby structures including: Bowel, bladder, pelvic blood vessels and nerves, ureters, and other nearby structures.  She understands amenorrhea is not guaranteed and occurs only about 50% of patients.  She understands the goal of the procedure is to resume a normal menses.  We discussed the risks of anesthesia.  The risks of postoperative complications, such as: Venous thromboembolism, myocardial infarction, stroke, and death.  The patient expressed her understanding of the risks, benefits, and alternatives to the procedure, and wishes to proceed.  We have discussed the risks, benefits, and alternatives to permanent sterilization  We discussed the risks including the risk of infection, bleeding and hemorrhage, injury to nearby structure including, bowel, bladder, pelvic vasculature, pelvic nerves, ovaries, and the uterus, and other nearby structures.  We have discussed the risks of regret, risk of failure, and if failure occurred and increased risk for ectopic pregnancy.  We discussed the risk of menstrual changes, hormonal changes, and risk of pelvic pain post sterilization.  The patient has been offered alternative forms of birth control including: Oral contraceptives, NuvaRing, birth control patches, progesterone only birth control pills, Depo-Provera, all intrauterine contraceptives, partner vasectomy.  The patient expresses her understanding and wished to proceed with female permanent sterilization.  We have discussed the different methods of female sterilization including hysteroscopic and laparoscopic techniques.  With the laparoscopic techniques we have discussed occlusion of the tube with Filshie clips, Hulka clips, and Falope-Rings.  We have discussed cauterization of the fallopian tube, partial salpingectomy, and complete salpingectomy.  The patient has elected to proceed with salpingectomy.      Electronically signed by Jeferson Stark MD,  08/25/21, 9:41 PM EDT.

## 2021-08-26 NOTE — H&P (VIEW-ONLY)
[facility]   HISTORY AND PHYSICAL    Patient Name: Mary Patel  : 1985  MRN: [id number]  Primary Care Physician:  Provider, No Known  Date of admission: 21    Subjective   Subjective     Chief Complaint: Here for surgery    HPI:    Mary Patel is a 36 y.o. female with a history significant for heavy menstrual flow 2 times per month lasting 7 to 14 days each time she had a menstrual flow.  She reports pelvic cramping with her menstrual flow.  She has tried medical therapy had no improvement in her symptoms at this point she desires no future childbearing and is interested in permanent sterilization.  Given her failure of medical therapy discussed surgical treatment of her abnormal uterine bleeding.  After reviewing the different options for further medical management and surgical management patient wished to proceed with hysteroscopy dilatation curettage and NovaSure endometrial ablation.  She also wished to have a bilateral salpingectomy for sterilization at time of her endometrial ablation.    Review of Systems   All systems were reviewed and negative except for: Heavy menses, frequent menses, painful menses, prolonged menses    Personal History     Past Medical History:   Diagnosis Date   • Colon polyps    • GERD (gastroesophageal reflux disease)    • Migraine        Past Surgical History:   Procedure Laterality Date   • CHOLECYSTECTOMY     • COLONOSCOPY     • ENDOSCOPY         Family History: family history is not on file. Otherwise pertinent FHx was reviewed and not pertinent to current issue.    Social History:  reports that she has been smoking cigarettes. She has been smoking about 0.50 packs per day. She has never used smokeless tobacco. She reports that she does not drink alcohol and does not use drugs.    Home Medications:  SUMAtriptan and cetirizine-pseudoephedrine      Allergies:  Allergies   Allergen Reactions   • Morphine Anaphylaxis       Objective   Objective     Vitals:  Blood pressure 116/75 pulse 76 weight 141 pounds     Physical Exam    Constitutional: Awake, alert   Eyes: PERRLA, sclerae anicteric, no conjunctival injection   HENT: NCAT, mucous membranes moist   Neck: Supple, no thyromegaly, no lymphadenopathy, trachea midline   Respiratory: Clear to auscultation bilaterally, nonlabored respirations    Cardiovascular: RRR, no murmurs, rubs, or gallops, palpable pedal pulses bilaterally   Gastrointestinal: Positive bowel sounds, soft, nontender, nondistended              Genitourinary: Normal external genitalia, vagina, and cervix.  The uterus is approximately 10 cm mobile nontender with no palpable uterine or adnexal masses the uterus is anteverted.   Musculoskeletal: No bilateral ankle edema, no clubbing or cyanosis to extremities   Psychiatric: Appropriate affect, cooperative   Neurologic: Oriented x 3, strength symmetric in all extremities, speech clear   Skin: No rashes     Result Review    Result Review:  I have personally reviewed the results from the time of this admission to 8/25/2021 21:41 EDT and agree with these findings:  [x]  Laboratory  []  Microbiology  [x]  Radiology  []  EKG/Telemetry   []  Cardiology/Vascular   [x]  Pathology  [x]  Old records  []  Other:      Assessment/Plan   Assessment / Plan     Assessment:  Abnormal uterine bleeding  Unwanted fertility    Plan:   Given the patient's failure of medical therapy she wishes to proceed with surgical therapy.  She has declined hysteroscopy dilatation curettage. She has also declined hysterectomy.  She wishes to proceed with hysteroscopy, dilatation curettage, endometrial ablation.  She is also expresses desire for no ability for future childbearing.  She wishes to undergo permanent sterilization at time of her initial ablation.  The risks, benefits, and alternatives to hysteroscopy, dilatation curettage, and endometrial ablation have been reviewed the patient.  The risks included, but not limited to, infection,  bleeding, hemorrhage, blood transfusion. Injury to nearby structures including: Bowel, bladder, pelvic blood vessels and nerves, ureters, and other nearby structures.  She understands amenorrhea is not guaranteed and occurs only about 50% of patients.  She understands the goal of the procedure is to resume a normal menses.  We discussed the risks of anesthesia.  The risks of postoperative complications, such as: Venous thromboembolism, myocardial infarction, stroke, and death.  The patient expressed her understanding of the risks, benefits, and alternatives to the procedure, and wishes to proceed.  We have discussed the risks, benefits, and alternatives to permanent sterilization  We discussed the risks including the risk of infection, bleeding and hemorrhage, injury to nearby structure including, bowel, bladder, pelvic vasculature, pelvic nerves, ovaries, and the uterus, and other nearby structures.  We have discussed the risks of regret, risk of failure, and if failure occurred and increased risk for ectopic pregnancy.  We discussed the risk of menstrual changes, hormonal changes, and risk of pelvic pain post sterilization.  The patient has been offered alternative forms of birth control including: Oral contraceptives, NuvaRing, birth control patches, progesterone only birth control pills, Depo-Provera, all intrauterine contraceptives, partner vasectomy.  The patient expresses her understanding and wished to proceed with female permanent sterilization.  We have discussed the different methods of female sterilization including hysteroscopic and laparoscopic techniques.  With the laparoscopic techniques we have discussed occlusion of the tube with Filshie clips, Hulka clips, and Falope-Rings.  We have discussed cauterization of the fallopian tube, partial salpingectomy, and complete salpingectomy.  The patient has elected to proceed with salpingectomy.      Electronically signed by Jeferson Stark MD,  08/25/21, 9:41 PM EDT.

## 2021-08-30 LAB
CYTO UR: NORMAL
LAB AP CASE REPORT: NORMAL
LAB AP CLINICAL INFORMATION: NORMAL
LAB AP DIAGNOSIS COMMENT: NORMAL
LAB AP SPECIAL STAINS: NORMAL
PATH REPORT.FINAL DX SPEC: NORMAL
PATH REPORT.GROSS SPEC: NORMAL

## 2021-09-14 ENCOUNTER — OFFICE VISIT (OUTPATIENT)
Dept: OBSTETRICS AND GYNECOLOGY | Facility: CLINIC | Age: 36
End: 2021-09-14

## 2021-09-14 VITALS
BODY MASS INDEX: 23.3 KG/M2 | HEART RATE: 88 BPM | DIASTOLIC BLOOD PRESSURE: 78 MMHG | WEIGHT: 140 LBS | SYSTOLIC BLOOD PRESSURE: 114 MMHG

## 2021-09-14 DIAGNOSIS — Z98.890 STATUS POST ENDOMETRIAL ABLATION: Primary | ICD-10-CM

## 2021-09-14 DIAGNOSIS — F17.200 SMOKER: ICD-10-CM

## 2021-09-14 DIAGNOSIS — N93.9 ABNORMAL UTERINE BLEEDING (AUB): ICD-10-CM

## 2021-09-14 DIAGNOSIS — R10.2 PELVIC PAIN: ICD-10-CM

## 2021-09-14 DIAGNOSIS — N73.6 PELVIC ADHESIONS: ICD-10-CM

## 2021-09-14 DIAGNOSIS — N87.1 DYSPLASIA OF CERVIX, HIGH GRADE CIN 2: ICD-10-CM

## 2021-09-14 PROBLEM — Z30.09 UNWANTED FERTILITY: Status: RESOLVED | Noted: 2021-08-11 | Resolved: 2021-09-14

## 2021-09-14 PROCEDURE — 99214 OFFICE O/P EST MOD 30 MIN: CPT | Performed by: OBSTETRICS & GYNECOLOGY

## 2021-09-14 RX ORDER — TRAMADOL HYDROCHLORIDE 50 MG/1
50 TABLET ORAL EVERY 6 HOURS PRN
Qty: 20 TABLET | Refills: 0 | Status: SHIPPED | OUTPATIENT
Start: 2021-09-14 | End: 2021-11-04

## 2021-09-14 NOTE — PROGRESS NOTES
GYN Visit    CC: Follow-up surgery    HPI:   36 y.o. status post laparoscopic salpingectomy and hysteroscopy, dilatation curettage, NovaSure endometrial ablation.  Patient still complains of significant pelvic cramping.  She still having some light bleeding but states that her pain is still very significant.  Ibuprofen does not help and irritates her stomach.  She is out of other pain medicines.      History: PMHx, Meds, Allergies, PSHx, Social Hx, and POBHx all reviewed and updated.    /78 (BP Location: Left arm, Patient Position: Sitting, Cuff Size: Adult)   Pulse 88   Wt 63.5 kg (140 lb)   LMP 2021 (Exact Date)   BMI 23.30 kg/m²     Physical Exam  Constitutional:       General: She is not in acute distress.     Appearance: Normal appearance. She is not ill-appearing.   Abdominal:      General: Abdomen is flat. Bowel sounds are normal. There is no distension.      Palpations: Abdomen is soft. There is no mass.      Tenderness: There is no abdominal tenderness. There is no guarding or rebound.      Hernia: No hernia is present.      Comments: Incision clean, dry, intact and well-healing   Musculoskeletal:         General: No swelling, tenderness or deformity.      Right lower leg: No edema.      Left lower leg: No edema.   Neurological:      Mental Status: She is alert.   Psychiatric:         Mood and Affect: Mood normal.         Behavior: Behavior normal.         Thought Content: Thought content normal.         Judgment: Judgment normal.           ASSESSMENT AND PLAN:  Diagnoses and all orders for this visit:    1. Status post endometrial ablation (Primary)  Comments:  Stable postop course  Orders:  -     traMADol (Ultram) 50 MG tablet; Take 1 tablet by mouth Every 6 (Six) Hours As Needed (POSTOP PAIN).  Dispense: 20 tablet; Refill: 0    2. Pelvic pain  Comments:  Suspect related to pelvic adhesions  Monika hysterectomy, risk, benefits, alternatives patient to consider  Orders:  -     traMADol  (Ultram) 50 MG tablet; Take 1 tablet by mouth Every 6 (Six) Hours As Needed (POSTOP PAIN).  Dispense: 20 tablet; Refill: 0    3. Pelvic adhesions  Comments:  Again, discussed hysterectomy given the extensive nature of the adhesions to the uterus    4. Dysplasia of cervix, high grade TAMIKA 2  Comments:  Colposcopy    5. Abnormal uterine bleeding (AUB)  Comments:  Some continued bleeding to be expected postoperatively.  Continue to monitor symptoms    6. Smoker  Comments:  Smoking cessation        Follow Up:  Return in about 2 weeks (around 9/28/2021) for COLPOSCOPY.        Jeferson Stark MD  09/14/2021

## 2021-09-21 ENCOUNTER — TELEPHONE (OUTPATIENT)
Dept: OBSTETRICS AND GYNECOLOGY | Facility: CLINIC | Age: 36
End: 2021-09-21

## 2021-09-21 NOTE — TELEPHONE ENCOUNTER
This patient had an appointment originally for 9/15/21 but has rescheduled to 9/28/21.  Do you want me to call her or can it wait until that visit? Please advise. Thank you.

## 2021-09-21 NOTE — TELEPHONE ENCOUNTER
----- Message from Jeferson Stark MD sent at 9/9/2021 12:51 PM EDT -----  If no show or reschedules follow up, patient needs a phone call or letter

## 2021-09-28 ENCOUNTER — OFFICE VISIT (OUTPATIENT)
Dept: OBSTETRICS AND GYNECOLOGY | Facility: CLINIC | Age: 36
End: 2021-09-28

## 2021-09-28 VITALS — BODY MASS INDEX: 23.3 KG/M2 | SYSTOLIC BLOOD PRESSURE: 123 MMHG | WEIGHT: 140 LBS | DIASTOLIC BLOOD PRESSURE: 76 MMHG

## 2021-09-28 DIAGNOSIS — R87.620 PAP SMEAR ABNORMALITY OF VAGINA WITH ASC-US: Primary | ICD-10-CM

## 2021-09-28 DIAGNOSIS — Z32.02 ENCOUNTER FOR PREGNANCY TEST WITH RESULT NEGATIVE: ICD-10-CM

## 2021-09-28 LAB
B-HCG UR QL: NEGATIVE
INTERNAL NEGATIVE CONTROL: NORMAL
INTERNAL POSITIVE CONTROL: NORMAL
Lab: NORMAL

## 2021-09-28 PROCEDURE — 81025 URINE PREGNANCY TEST: CPT | Performed by: OBSTETRICS & GYNECOLOGY

## 2021-09-28 PROCEDURE — 57456 ENDOCERV CURETTAGE W/SCOPE: CPT | Performed by: OBSTETRICS & GYNECOLOGY

## 2021-09-28 PROCEDURE — 88305 TISSUE EXAM BY PATHOLOGIST: CPT | Performed by: OBSTETRICS & GYNECOLOGY

## 2021-09-28 NOTE — PROGRESS NOTES
Colposcopy    Pregnant: No  Birth control: Tubal ligation  Tobacco/Nicotine use:  Yes  Pap result: ASCUS  cg:  Negative  Consent signed: Yes    CC: Presents for colposcopy     Procedure reviewed in detail.  She understands the potential risks include, but are not limited to, pain, bleeding, and infection.  Her questions have been answered.        Subjective:  Still having some abdominal cramping.  Vaginal bleeding has resolved.    Objective:  /76   Wt 63.5 kg (140 lb)   BMI 23.30 kg/m²   External genitalia- Without lesion  (add optional vulvar colpo bx)  Perineum- Without lesion  Vagina- Without lesion   Cvx- Adequate 100%TZ seen., Normal with no acetowhite epithelium seen    Physical Exam  Vitals and nursing note reviewed. Exam conducted with a chaperone present.   Constitutional:       General: She is not in acute distress.     Appearance: Normal appearance. She is normal weight. She is not ill-appearing.   Abdominal:      General: Abdomen is flat. Bowel sounds are normal. There is no distension.      Palpations: Abdomen is soft. There is no mass.      Tenderness: There is no abdominal tenderness. There is no guarding or rebound.      Hernia: No hernia is present. There is no hernia in the left inguinal area or right inguinal area.   Genitourinary:     General: Normal vulva.      Labia:         Right: No rash, tenderness, lesion or injury.         Left: No rash, tenderness, lesion or injury.       Vagina: No signs of injury and foreign body. No vaginal discharge, erythema, tenderness, bleeding, lesions or prolapsed vaginal walls.      Cervix: No cervical motion tenderness, discharge, friability, lesion, erythema, cervical bleeding or eversion.      Uterus: Normal. Not deviated, not enlarged, not fixed, not tender and no uterine prolapse.       Adnexa: Right adnexa normal and left adnexa normal.        Right: No mass, tenderness or fullness.          Left: No mass, tenderness or fullness.         Comments: Normal cervix on colposcopy.  Transformation zone seen.  Colposcopy is adequate.  No acetowhite epithelium seen.  ECC collected.  No biopsy performed  Lymphadenopathy:      Lower Body: No right inguinal adenopathy. No left inguinal adenopathy.   Neurological:      Mental Status: She is alert.      (pics..)    Assessment and Plan:  Diagnoses and all orders for this visit:    1. Pap smear abnormality of vagina with ASC-US (Primary)  -     Colposcopy  -     Tissue Pathology Exam    2. Encounter for pregnancy test with result negative  -     POC Pregnancy, Urine          Counseling:    We discussed HPV in detail.  Incidence, transmission, course, remission, progression, types, cervical cancer, genital warts, monitoring, and importance of compliance were reviewed.  A HPV handout was provided if she desired., HPV vaccine was discussed and recommended if appropriate.  Written information was made available.  The vaccine protects against the 2 types of HPV that cause 70% of cervical cancer and 90% of genital warts.  Condoms were recommended., I recommend she quit smoking.      She understands the need for continued follow up until she is cleared to return to routine screening pap smears.  She understands the importance of follow up and consequences with lack of follow up (i.e. potential for cervical cancer).  Follow up usually ranges every 6months - 12months.      PRECAUTIONS - It is common to have bright red spotting and dark discharge after today.  If she has had cervical biopsies, she is to avoid intercourse or tampons as directed for 7 days.  She can use OTC pain relievers prn.  She needs to return to office or ER (if after hours/weekends) if she has pelvic pain, bleeding > 1 pad/2 hours, discharge that has a bad vaginal odor or vaginal itching, fever > 101.5, or any other concerns.        Follow Up:  Return in about 1 week (around 10/5/2021) for discuss surgery.      Jeferson Stark MD  09/28/2021

## 2021-10-01 LAB
CYTO UR: NORMAL
LAB AP CASE REPORT: NORMAL
LAB AP CLINICAL INFORMATION: NORMAL
PATH REPORT.FINAL DX SPEC: NORMAL
PATH REPORT.GROSS SPEC: NORMAL

## 2021-10-04 ENCOUNTER — TELEPHONE (OUTPATIENT)
Dept: OBSTETRICS AND GYNECOLOGY | Facility: CLINIC | Age: 36
End: 2021-10-04

## 2021-10-06 ENCOUNTER — OFFICE VISIT (OUTPATIENT)
Dept: OBSTETRICS AND GYNECOLOGY | Facility: CLINIC | Age: 36
End: 2021-10-06

## 2021-10-06 VITALS
HEART RATE: 56 BPM | WEIGHT: 138 LBS | BODY MASS INDEX: 22.96 KG/M2 | DIASTOLIC BLOOD PRESSURE: 77 MMHG | SYSTOLIC BLOOD PRESSURE: 115 MMHG

## 2021-10-06 DIAGNOSIS — N73.6 PELVIC ADHESIONS: Chronic | ICD-10-CM

## 2021-10-06 DIAGNOSIS — Z98.890 STATUS POST ENDOMETRIAL ABLATION: ICD-10-CM

## 2021-10-06 DIAGNOSIS — F17.200 SMOKER: ICD-10-CM

## 2021-10-06 DIAGNOSIS — N87.1 DYSPLASIA OF CERVIX, HIGH GRADE CIN 2: ICD-10-CM

## 2021-10-06 DIAGNOSIS — N93.9 ABNORMAL UTERINE BLEEDING (AUB): Chronic | ICD-10-CM

## 2021-10-06 DIAGNOSIS — R10.2 PELVIC PAIN: Primary | ICD-10-CM

## 2021-10-06 PROCEDURE — 99214 OFFICE O/P EST MOD 30 MIN: CPT | Performed by: OBSTETRICS & GYNECOLOGY

## 2021-10-06 RX ORDER — ONDANSETRON 2 MG/ML
4 INJECTION INTRAMUSCULAR; INTRAVENOUS EVERY 6 HOURS PRN
Status: CANCELLED | OUTPATIENT
Start: 2021-10-06

## 2021-10-06 RX ORDER — SODIUM CHLORIDE, SODIUM LACTATE, POTASSIUM CHLORIDE, CALCIUM CHLORIDE 600; 310; 30; 20 MG/100ML; MG/100ML; MG/100ML; MG/100ML
125 INJECTION, SOLUTION INTRAVENOUS CONTINUOUS
Status: CANCELLED | OUTPATIENT
Start: 2021-10-06

## 2021-10-06 NOTE — PROGRESS NOTES
GYN Visit    CC: Discuss surgery    HPI:   36 y.o. No problems since ECC. AUB has improved since the ablation. Still having very significant pelvic pain. NSAIDs really don't help much. Pain seems worse since the surgery. Has decided she would like to proceed with TLH with ovarian conservation.    ECC - negative for dysplasia    History: PMHx, Meds, Allergies, PSHx, Social Hx, and POBHx all reviewed and updated.    /77   Pulse 56   Wt 62.6 kg (138 lb)   LMP 2021   BMI 22.96 kg/m²     Physical Exam  Vitals and nursing note reviewed.   Constitutional:       General: She is not in acute distress.     Appearance: Normal appearance. She is normal weight. She is not ill-appearing.   HENT:      Head: Normocephalic and atraumatic.      Mouth/Throat:      Mouth: Mucous membranes are moist.      Pharynx: Oropharynx is clear.   Eyes:      Extraocular Movements: Extraocular movements intact.   Cardiovascular:      Rate and Rhythm: Normal rate and regular rhythm.   Pulmonary:      Effort: Pulmonary effort is normal. No respiratory distress.      Breath sounds: Normal breath sounds. No wheezing or rhonchi.   Abdominal:      General: Abdomen is flat. Bowel sounds are normal. There is no distension.      Palpations: Abdomen is soft. There is no mass.      Tenderness: There is abdominal tenderness. There is no guarding or rebound.      Hernia: No hernia is present.      Comments: TTP in the bilateral lower quadrants   Genitourinary:     Comments: Reviewed from last exam  Musculoskeletal:         General: No swelling, tenderness, deformity or signs of injury.      Cervical back: Neck supple. No tenderness.      Right lower leg: No edema.      Left lower leg: No edema.   Skin:     General: Skin is warm and dry.      Coloration: Skin is pale. Skin is not jaundiced.      Findings: No bruising, erythema, lesion or rash.   Neurological:      Mental Status: She is alert and oriented to person, place, and time.    Psychiatric:         Mood and Affect: Mood normal.         Behavior: Behavior normal.         Thought Content: Thought content normal.           ASSESSMENT AND PLAN:  Diagnoses and all orders for this visit:    1. Pelvic pain (Primary)  Assessment & Plan:  Due to uterine adhesions  Discussed management options including hysterectomy.  The patient desire TLH with ovarian conservation (ovaries were uninvolved).   The risks, benefits and alternatives were discussed  Discussed that the adhesions add additional risk factor to the procedure, and I cannot guarantee they will not reform, or that the patient will be pain free post surgery    Orders:  -     Case Request; Standing  -     COVID-19,CEPHEID/BLANCA/BDMAX,COR/DAHLIA/PAD/FELICIA IN-HOUSE(OR EMERGENT/ADD-ON),NP SWAB IN TRANSPORT MEDIA 3-4 HR TAT, RT-PCR - Swab, Nasopharynx; Future  -     Pregnancy, Urine - Urine, Clean Catch; Future  -     Case Request    2. Dysplasia of cervix, high grade TAMIKA 2  Assessment & Plan:  TAMIKA 2 was found in endocervical cells at the time a a D&C  Separate ECC was negative  Planning hysterectomy for pelvic pain which should treat any abnormality here      3. Pelvic adhesions  Overview:  Dense adhesions of the uterus to the anterior abdominal wall on laparoscopy.      4. Status post endometrial ablation  Assessment & Plan:  Stable post operative course. AUB is improved      5. Smoker  Assessment & Plan:  Smoking cessation counseling          6. Abnormal uterine bleeding (AUB)  Assessment & Plan:  Improved post endometrial ablation      Other orders  -     Follow Anesthesia Guidelines / Protocol; Future  -     Provide NPO Instructions to Patient; Future      Follow Up:  Return for after OR.    Jeferson Stark MD  10/06/2021

## 2021-10-08 PROBLEM — F17.200 SMOKER: Chronic | Status: ACTIVE | Noted: 2021-09-14

## 2021-10-08 PROBLEM — R87.620 PAP SMEAR ABNORMALITY OF VAGINA WITH ASC-US: Status: RESOLVED | Noted: 2021-09-28 | Resolved: 2021-10-08

## 2021-10-08 NOTE — ASSESSMENT & PLAN NOTE
Due to uterine adhesions  Discussed management options including hysterectomy.  The patient desire TLH with ovarian conservation (ovaries were uninvolved).   The risks, benefits and alternatives were discussed  Discussed that the adhesions add additional risk factor to the procedure, and I cannot guarantee they will not reform, or that the patient will be pain free post surgery

## 2021-10-08 NOTE — ASSESSMENT & PLAN NOTE
TAMIKA 2 was found in endocervical cells at the time a a D&C  Separate ECC was negative  Planning hysterectomy for pelvic pain which should treat any abnormality here

## 2021-10-11 ENCOUNTER — TRANSCRIBE ORDERS (OUTPATIENT)
Dept: LAB | Facility: HOSPITAL | Age: 36
End: 2021-10-11

## 2021-10-11 DIAGNOSIS — Z01.818 PREOP TESTING: Primary | ICD-10-CM

## 2021-10-20 ENCOUNTER — TELEPHONE (OUTPATIENT)
Dept: OBSTETRICS AND GYNECOLOGY | Facility: CLINIC | Age: 36
End: 2021-10-20

## 2021-10-21 ENCOUNTER — TELEPHONE (OUTPATIENT)
Dept: OBSTETRICS AND GYNECOLOGY | Facility: CLINIC | Age: 36
End: 2021-10-21

## 2021-11-05 ENCOUNTER — TELEPHONE (OUTPATIENT)
Dept: OBSTETRICS AND GYNECOLOGY | Facility: CLINIC | Age: 36
End: 2021-11-05

## 2021-11-10 ENCOUNTER — TELEPHONE (OUTPATIENT)
Dept: OBSTETRICS AND GYNECOLOGY | Facility: CLINIC | Age: 36
End: 2021-11-10

## 2021-11-12 ENCOUNTER — LAB (OUTPATIENT)
Dept: LAB | Facility: HOSPITAL | Age: 36
End: 2021-11-12

## 2021-11-12 DIAGNOSIS — Z01.818 PREOP TESTING: ICD-10-CM

## 2021-11-12 PROCEDURE — U0004 COV-19 TEST NON-CDC HGH THRU: HCPCS

## 2021-11-13 LAB — SARS-COV-2 RNA NOSE QL NAA+PROBE: NOT DETECTED

## 2021-11-15 ENCOUNTER — OFFICE VISIT (OUTPATIENT)
Dept: OBSTETRICS AND GYNECOLOGY | Facility: CLINIC | Age: 36
End: 2021-11-15

## 2021-11-15 VITALS
HEIGHT: 64 IN | SYSTOLIC BLOOD PRESSURE: 102 MMHG | DIASTOLIC BLOOD PRESSURE: 68 MMHG | WEIGHT: 143 LBS | HEART RATE: 79 BPM | BODY MASS INDEX: 24.41 KG/M2

## 2021-11-15 DIAGNOSIS — R10.2 PELVIC PAIN: Primary | Chronic | ICD-10-CM

## 2021-11-15 DIAGNOSIS — N73.6 PELVIC ADHESIONS: Chronic | ICD-10-CM

## 2021-11-15 DIAGNOSIS — N93.9 ABNORMAL UTERINE BLEEDING (AUB): Chronic | ICD-10-CM

## 2021-11-15 DIAGNOSIS — N87.1 DYSPLASIA OF CERVIX, HIGH GRADE CIN 2: ICD-10-CM

## 2021-11-15 DIAGNOSIS — F17.200 SMOKER: Chronic | ICD-10-CM

## 2021-11-15 DIAGNOSIS — Z98.890 STATUS POST ENDOMETRIAL ABLATION: ICD-10-CM

## 2021-11-15 PROCEDURE — 99213 OFFICE O/P EST LOW 20 MIN: CPT | Performed by: OBSTETRICS & GYNECOLOGY

## 2021-11-17 ENCOUNTER — PREP FOR SURGERY (OUTPATIENT)
Dept: OTHER | Facility: HOSPITAL | Age: 36
End: 2021-11-17

## 2021-11-17 DIAGNOSIS — N93.9 ABNORMAL UTERINE BLEEDING (AUB): ICD-10-CM

## 2021-11-17 DIAGNOSIS — N94.6 DYSMENORRHEA: ICD-10-CM

## 2021-11-17 DIAGNOSIS — R10.2 CHRONIC PELVIC PAIN IN FEMALE: Primary | ICD-10-CM

## 2021-11-17 DIAGNOSIS — G89.29 CHRONIC PELVIC PAIN IN FEMALE: Primary | ICD-10-CM

## 2021-11-17 DIAGNOSIS — N87.1 DYSPLASIA OF CERVIX, HIGH GRADE CIN 2: ICD-10-CM

## 2021-11-17 PROCEDURE — S0260 H&P FOR SURGERY: HCPCS | Performed by: OBSTETRICS & GYNECOLOGY

## 2021-11-18 ENCOUNTER — HOSPITAL ENCOUNTER (OUTPATIENT)
Facility: HOSPITAL | Age: 36
Discharge: HOME | End: 2021-11-19
Attending: OBSTETRICS & GYNECOLOGY | Admitting: OBSTETRICS & GYNECOLOGY

## 2021-11-18 ENCOUNTER — ANESTHESIA EVENT (OUTPATIENT)
Dept: PERIOP | Facility: HOSPITAL | Age: 36
End: 2021-11-18

## 2021-11-18 ENCOUNTER — ANESTHESIA (OUTPATIENT)
Dept: PERIOP | Facility: HOSPITAL | Age: 36
End: 2021-11-18

## 2021-11-18 DIAGNOSIS — Z90.710 S/P LAPAROSCOPIC HYSTERECTOMY: Primary | ICD-10-CM

## 2021-11-18 DIAGNOSIS — R10.2 PELVIC PAIN: ICD-10-CM

## 2021-11-18 PROBLEM — H69.93 DYSFUNCTION OF BOTH EUSTACHIAN TUBES: Status: ACTIVE | Noted: 2021-11-18

## 2021-11-18 PROBLEM — G43.109 MIGRAINE AURA WITHOUT HEADACHE: Status: ACTIVE | Noted: 2021-11-18

## 2021-11-18 PROBLEM — IMO0002 CHRONIC MIGRAINE: Status: ACTIVE | Noted: 2021-11-18

## 2021-11-18 PROBLEM — H65.90 NON-SUPPURATIVE OTITIS MEDIA: Status: ACTIVE | Noted: 2021-11-18

## 2021-11-18 PROBLEM — J30.2 SEASONAL ALLERGIC RHINITIS: Status: ACTIVE | Noted: 2021-11-18

## 2021-11-18 PROBLEM — H69.83 DYSFUNCTION OF BOTH EUSTACHIAN TUBES: Status: ACTIVE | Noted: 2021-11-18

## 2021-11-18 LAB
ABO GROUP BLD: NORMAL
BASOPHILS # BLD AUTO: 0.05 10*3/MM3 (ref 0–0.2)
BASOPHILS NFR BLD AUTO: 0.6 % (ref 0–1.5)
BLD GP AB SCN SERPL QL: NEGATIVE
DEPRECATED RDW RBC AUTO: 41.4 FL (ref 37–54)
EOSINOPHIL # BLD AUTO: 0.25 10*3/MM3 (ref 0–0.4)
EOSINOPHIL NFR BLD AUTO: 3 % (ref 0.3–6.2)
ERYTHROCYTE [DISTWIDTH] IN BLOOD BY AUTOMATED COUNT: 11.9 % (ref 12.3–15.4)
HCT VFR BLD AUTO: 38.4 % (ref 34–46.6)
HGB BLD-MCNC: 13.3 G/DL (ref 12–15.9)
IMM GRANULOCYTES # BLD AUTO: 0.03 10*3/MM3 (ref 0–0.05)
IMM GRANULOCYTES NFR BLD AUTO: 0.4 % (ref 0–0.5)
LYMPHOCYTES # BLD AUTO: 2.34 10*3/MM3 (ref 0.7–3.1)
LYMPHOCYTES NFR BLD AUTO: 28 % (ref 19.6–45.3)
MCH RBC QN AUTO: 32.4 PG (ref 26.6–33)
MCHC RBC AUTO-ENTMCNC: 34.6 G/DL (ref 31.5–35.7)
MCV RBC AUTO: 93.7 FL (ref 79–97)
MONOCYTES # BLD AUTO: 0.97 10*3/MM3 (ref 0.1–0.9)
MONOCYTES NFR BLD AUTO: 11.6 % (ref 5–12)
NEUTROPHILS NFR BLD AUTO: 4.73 10*3/MM3 (ref 1.7–7)
NEUTROPHILS NFR BLD AUTO: 56.4 % (ref 42.7–76)
NRBC BLD AUTO-RTO: 0 /100 WBC (ref 0–0.2)
PLATELET # BLD AUTO: 257 10*3/MM3 (ref 140–450)
PMV BLD AUTO: 9.5 FL (ref 6–12)
RBC # BLD AUTO: 4.1 10*6/MM3 (ref 3.77–5.28)
RH BLD: POSITIVE
T&S EXPIRATION DATE: NORMAL
WBC NRBC COR # BLD: 8.37 10*3/MM3 (ref 3.4–10.8)

## 2021-11-18 PROCEDURE — 63710000001 PREDNISONE PER 1 MG: Performed by: OBSTETRICS & GYNECOLOGY

## 2021-11-18 PROCEDURE — 25010000002 PROPOFOL 10 MG/ML EMULSION: Performed by: NURSE ANESTHETIST, CERTIFIED REGISTERED

## 2021-11-18 PROCEDURE — 86901 BLOOD TYPING SEROLOGIC RH(D): CPT | Performed by: OBSTETRICS & GYNECOLOGY

## 2021-11-18 PROCEDURE — 25010000002 DEXAMETHASONE PER 1 MG: Performed by: NURSE ANESTHETIST, CERTIFIED REGISTERED

## 2021-11-18 PROCEDURE — 25010000002 HYDROMORPHONE PER 4 MG: Performed by: NURSE ANESTHETIST, CERTIFIED REGISTERED

## 2021-11-18 PROCEDURE — 58570 TLH UTERUS 250 G OR LESS: CPT | Performed by: OBSTETRICS & GYNECOLOGY

## 2021-11-18 PROCEDURE — 25010000002 ONDANSETRON PER 1 MG: Performed by: NURSE ANESTHETIST, CERTIFIED REGISTERED

## 2021-11-18 PROCEDURE — 0 MEPERIDINE PER 100 MG: Performed by: NURSE ANESTHETIST, CERTIFIED REGISTERED

## 2021-11-18 PROCEDURE — 25010000002 HYDROMORPHONE 1 MG/ML SOLUTION: Performed by: NURSE ANESTHETIST, CERTIFIED REGISTERED

## 2021-11-18 PROCEDURE — 88309 TISSUE EXAM BY PATHOLOGIST: CPT | Performed by: OBSTETRICS & GYNECOLOGY

## 2021-11-18 PROCEDURE — 0 CEFAZOLIN IN DEXTROSE 2-4 GM/100ML-% SOLUTION: Performed by: OBSTETRICS & GYNECOLOGY

## 2021-11-18 PROCEDURE — 86850 RBC ANTIBODY SCREEN: CPT | Performed by: OBSTETRICS & GYNECOLOGY

## 2021-11-18 PROCEDURE — 25010000002 MIDAZOLAM PER 1MG: Performed by: STUDENT IN AN ORGANIZED HEALTH CARE EDUCATION/TRAINING PROGRAM

## 2021-11-18 PROCEDURE — 25010000002 KETOROLAC TROMETHAMINE PER 15 MG: Performed by: NURSE ANESTHETIST, CERTIFIED REGISTERED

## 2021-11-18 PROCEDURE — 25010000002 FENTANYL CITRATE (PF) 50 MCG/ML SOLUTION: Performed by: NURSE ANESTHETIST, CERTIFIED REGISTERED

## 2021-11-18 PROCEDURE — 86900 BLOOD TYPING SEROLOGIC ABO: CPT | Performed by: OBSTETRICS & GYNECOLOGY

## 2021-11-18 PROCEDURE — 85025 COMPLETE CBC W/AUTO DIFF WBC: CPT | Performed by: OBSTETRICS & GYNECOLOGY

## 2021-11-18 DEVICE — ABSORBABLE WOUND CLOSURE DEVICE
Type: IMPLANTABLE DEVICE | Site: PELVIS | Status: FUNCTIONAL
Brand: V-LOC 180

## 2021-11-18 RX ORDER — OXYCODONE HYDROCHLORIDE 5 MG/1
5 TABLET ORAL
Status: DISCONTINUED | OUTPATIENT
Start: 2021-11-18 | End: 2021-11-18 | Stop reason: HOSPADM

## 2021-11-18 RX ORDER — SODIUM CHLORIDE 0.9 % (FLUSH) 0.9 %
3 SYRINGE (ML) INJECTION EVERY 12 HOURS SCHEDULED
Status: DISCONTINUED | OUTPATIENT
Start: 2021-11-18 | End: 2021-11-19 | Stop reason: HOSPADM

## 2021-11-18 RX ORDER — ONDANSETRON 2 MG/ML
INJECTION INTRAMUSCULAR; INTRAVENOUS AS NEEDED
Status: DISCONTINUED | OUTPATIENT
Start: 2021-11-18 | End: 2021-11-18 | Stop reason: SURG

## 2021-11-18 RX ORDER — MIDAZOLAM HYDROCHLORIDE 2 MG/2ML
2 INJECTION, SOLUTION INTRAMUSCULAR; INTRAVENOUS ONCE
Status: DISCONTINUED | OUTPATIENT
Start: 2021-11-18 | End: 2021-11-18 | Stop reason: SDUPTHER

## 2021-11-18 RX ORDER — LIDOCAINE HYDROCHLORIDE 20 MG/ML
INJECTION, SOLUTION INFILTRATION; PERINEURAL AS NEEDED
Status: DISCONTINUED | OUTPATIENT
Start: 2021-11-18 | End: 2021-11-18 | Stop reason: SURG

## 2021-11-18 RX ORDER — ACETAMINOPHEN 500 MG
1000 TABLET ORAL ONCE
Status: DISCONTINUED | OUTPATIENT
Start: 2021-11-18 | End: 2021-11-18 | Stop reason: SDUPTHER

## 2021-11-18 RX ORDER — PROMETHAZINE HYDROCHLORIDE 25 MG/1
25 SUPPOSITORY RECTAL ONCE AS NEEDED
Status: DISCONTINUED | OUTPATIENT
Start: 2021-11-18 | End: 2021-11-18 | Stop reason: HOSPADM

## 2021-11-18 RX ORDER — ONDANSETRON 2 MG/ML
4 INJECTION INTRAMUSCULAR; INTRAVENOUS ONCE AS NEEDED
Status: DISCONTINUED | OUTPATIENT
Start: 2021-11-18 | End: 2021-11-18 | Stop reason: HOSPADM

## 2021-11-18 RX ORDER — ROCURONIUM BROMIDE 10 MG/ML
INJECTION, SOLUTION INTRAVENOUS AS NEEDED
Status: DISCONTINUED | OUTPATIENT
Start: 2021-11-18 | End: 2021-11-18 | Stop reason: SURG

## 2021-11-18 RX ORDER — AMOXICILLIN 250 MG
1 CAPSULE ORAL 2 TIMES DAILY
Status: DISCONTINUED | OUTPATIENT
Start: 2021-11-18 | End: 2021-11-19 | Stop reason: HOSPADM

## 2021-11-18 RX ORDER — SODIUM CHLORIDE, SODIUM LACTATE, POTASSIUM CHLORIDE, CALCIUM CHLORIDE 600; 310; 30; 20 MG/100ML; MG/100ML; MG/100ML; MG/100ML
125 INJECTION, SOLUTION INTRAVENOUS CONTINUOUS
Status: DISCONTINUED | OUTPATIENT
Start: 2021-11-18 | End: 2021-11-18

## 2021-11-18 RX ORDER — PROPOFOL 10 MG/ML
VIAL (ML) INTRAVENOUS AS NEEDED
Status: DISCONTINUED | OUTPATIENT
Start: 2021-11-18 | End: 2021-11-18 | Stop reason: SURG

## 2021-11-18 RX ORDER — PANTOPRAZOLE SODIUM 40 MG/1
40 TABLET, DELAYED RELEASE ORAL EVERY MORNING
Status: DISCONTINUED | OUTPATIENT
Start: 2021-11-19 | End: 2021-11-19 | Stop reason: HOSPADM

## 2021-11-18 RX ORDER — ACETAMINOPHEN 325 MG/1
650 TABLET ORAL EVERY 6 HOURS
Status: DISCONTINUED | OUTPATIENT
Start: 2021-11-18 | End: 2021-11-19 | Stop reason: HOSPADM

## 2021-11-18 RX ORDER — KETOROLAC TROMETHAMINE 30 MG/ML
30 INJECTION, SOLUTION INTRAMUSCULAR; INTRAVENOUS EVERY 6 HOURS PRN
Status: DISCONTINUED | OUTPATIENT
Start: 2021-11-18 | End: 2021-11-19 | Stop reason: HOSPADM

## 2021-11-18 RX ORDER — KETOROLAC TROMETHAMINE 30 MG/ML
INJECTION, SOLUTION INTRAMUSCULAR; INTRAVENOUS AS NEEDED
Status: DISCONTINUED | OUTPATIENT
Start: 2021-11-18 | End: 2021-11-18 | Stop reason: SURG

## 2021-11-18 RX ORDER — ACETAMINOPHEN 500 MG
1000 TABLET ORAL ONCE
Status: COMPLETED | OUTPATIENT
Start: 2021-11-18 | End: 2021-11-18

## 2021-11-18 RX ORDER — SODIUM CHLORIDE, SODIUM LACTATE, POTASSIUM CHLORIDE, CALCIUM CHLORIDE 600; 310; 30; 20 MG/100ML; MG/100ML; MG/100ML; MG/100ML
9 INJECTION, SOLUTION INTRAVENOUS CONTINUOUS PRN
Status: DISCONTINUED | OUTPATIENT
Start: 2021-11-18 | End: 2021-11-18 | Stop reason: HOSPADM

## 2021-11-18 RX ORDER — HYDROMORPHONE HCL 110MG/55ML
PATIENT CONTROLLED ANALGESIA SYRINGE INTRAVENOUS AS NEEDED
Status: DISCONTINUED | OUTPATIENT
Start: 2021-11-18 | End: 2021-11-18 | Stop reason: SURG

## 2021-11-18 RX ORDER — KETAMINE HYDROCHLORIDE 50 MG/ML
INJECTION, SOLUTION, CONCENTRATE INTRAMUSCULAR; INTRAVENOUS AS NEEDED
Status: DISCONTINUED | OUTPATIENT
Start: 2021-11-18 | End: 2021-11-18 | Stop reason: SURG

## 2021-11-18 RX ORDER — SODIUM CHLORIDE, SODIUM LACTATE, POTASSIUM CHLORIDE, CALCIUM CHLORIDE 600; 310; 30; 20 MG/100ML; MG/100ML; MG/100ML; MG/100ML
125 INJECTION, SOLUTION INTRAVENOUS CONTINUOUS
Status: DISCONTINUED | OUTPATIENT
Start: 2021-11-18 | End: 2021-11-19 | Stop reason: HOSPADM

## 2021-11-18 RX ORDER — ONDANSETRON 2 MG/ML
4 INJECTION INTRAMUSCULAR; INTRAVENOUS EVERY 6 HOURS PRN
Status: DISCONTINUED | OUTPATIENT
Start: 2021-11-18 | End: 2021-11-19 | Stop reason: HOSPADM

## 2021-11-18 RX ORDER — CEFAZOLIN SODIUM 2 G/100ML
2 INJECTION, SOLUTION INTRAVENOUS ONCE
Status: COMPLETED | OUTPATIENT
Start: 2021-11-18 | End: 2021-11-18

## 2021-11-18 RX ORDER — PROMETHAZINE HYDROCHLORIDE 12.5 MG/1
25 TABLET ORAL ONCE AS NEEDED
Status: DISCONTINUED | OUTPATIENT
Start: 2021-11-18 | End: 2021-11-18 | Stop reason: HOSPADM

## 2021-11-18 RX ORDER — PREDNISONE 20 MG/1
20 TABLET ORAL 2 TIMES DAILY
Status: COMPLETED | OUTPATIENT
Start: 2021-11-18 | End: 2021-11-19

## 2021-11-18 RX ORDER — BUPIVACAINE HYDROCHLORIDE AND EPINEPHRINE 5; 5 MG/ML; UG/ML
INJECTION, SOLUTION EPIDURAL; INTRACAUDAL; PERINEURAL AS NEEDED
Status: DISCONTINUED | OUTPATIENT
Start: 2021-11-18 | End: 2021-11-18 | Stop reason: HOSPADM

## 2021-11-18 RX ORDER — ONDANSETRON 2 MG/ML
4 INJECTION INTRAMUSCULAR; INTRAVENOUS EVERY 6 HOURS PRN
Status: DISCONTINUED | OUTPATIENT
Start: 2021-11-18 | End: 2021-11-18

## 2021-11-18 RX ORDER — SODIUM CHLORIDE 0.9 % (FLUSH) 0.9 %
10 SYRINGE (ML) INJECTION AS NEEDED
Status: DISCONTINUED | OUTPATIENT
Start: 2021-11-18 | End: 2021-11-19 | Stop reason: HOSPADM

## 2021-11-18 RX ORDER — MEPERIDINE HYDROCHLORIDE 25 MG/ML
12.5 INJECTION INTRAMUSCULAR; INTRAVENOUS; SUBCUTANEOUS
Status: DISCONTINUED | OUTPATIENT
Start: 2021-11-18 | End: 2021-11-18 | Stop reason: HOSPADM

## 2021-11-18 RX ORDER — MIDAZOLAM HYDROCHLORIDE 2 MG/2ML
2 INJECTION, SOLUTION INTRAMUSCULAR; INTRAVENOUS ONCE
Status: COMPLETED | OUTPATIENT
Start: 2021-11-18 | End: 2021-11-18

## 2021-11-18 RX ORDER — FENTANYL CITRATE 50 UG/ML
INJECTION, SOLUTION INTRAMUSCULAR; INTRAVENOUS AS NEEDED
Status: DISCONTINUED | OUTPATIENT
Start: 2021-11-18 | End: 2021-11-18 | Stop reason: SURG

## 2021-11-18 RX ORDER — HYDROCODONE BITARTRATE AND ACETAMINOPHEN 5; 325 MG/1; MG/1
1 TABLET ORAL EVERY 4 HOURS PRN
Status: DISCONTINUED | OUTPATIENT
Start: 2021-11-18 | End: 2021-11-19 | Stop reason: HOSPADM

## 2021-11-18 RX ORDER — ONDANSETRON 4 MG/1
4 TABLET, FILM COATED ORAL EVERY 6 HOURS PRN
Status: DISCONTINUED | OUTPATIENT
Start: 2021-11-18 | End: 2021-11-19 | Stop reason: HOSPADM

## 2021-11-18 RX ORDER — HYDROCODONE BITARTRATE AND ACETAMINOPHEN 10; 325 MG/1; MG/1
1 TABLET ORAL EVERY 4 HOURS PRN
Status: DISCONTINUED | OUTPATIENT
Start: 2021-11-18 | End: 2021-11-19 | Stop reason: HOSPADM

## 2021-11-18 RX ORDER — MAGNESIUM HYDROXIDE 1200 MG/15ML
LIQUID ORAL AS NEEDED
Status: DISCONTINUED | OUTPATIENT
Start: 2021-11-18 | End: 2021-11-18 | Stop reason: HOSPADM

## 2021-11-18 RX ORDER — NALOXONE HCL 0.4 MG/ML
0.1 VIAL (ML) INJECTION
Status: DISCONTINUED | OUTPATIENT
Start: 2021-11-18 | End: 2021-11-19 | Stop reason: HOSPADM

## 2021-11-18 RX ORDER — IBUPROFEN 600 MG/1
600 TABLET ORAL EVERY 6 HOURS SCHEDULED
Status: DISCONTINUED | OUTPATIENT
Start: 2021-11-18 | End: 2021-11-19 | Stop reason: HOSPADM

## 2021-11-18 RX ORDER — EPHEDRINE SULFATE 50 MG/ML
INJECTION, SOLUTION INTRAVENOUS AS NEEDED
Status: DISCONTINUED | OUTPATIENT
Start: 2021-11-18 | End: 2021-11-18 | Stop reason: SURG

## 2021-11-18 RX ORDER — ACETAMINOPHEN 650 MG/1
650 SUPPOSITORY RECTAL EVERY 6 HOURS
Status: DISCONTINUED | OUTPATIENT
Start: 2021-11-18 | End: 2021-11-19 | Stop reason: HOSPADM

## 2021-11-18 RX ORDER — DEXAMETHASONE SODIUM PHOSPHATE 4 MG/ML
INJECTION, SOLUTION INTRA-ARTICULAR; INTRALESIONAL; INTRAMUSCULAR; INTRAVENOUS; SOFT TISSUE AS NEEDED
Status: DISCONTINUED | OUTPATIENT
Start: 2021-11-18 | End: 2021-11-18 | Stop reason: SURG

## 2021-11-18 RX ADMIN — HYDROMORPHONE HYDROCHLORIDE 1 MG: 2 INJECTION, SOLUTION INTRAMUSCULAR; INTRAVENOUS; SUBCUTANEOUS at 11:37

## 2021-11-18 RX ADMIN — HYDROMORPHONE HYDROCHLORIDE 1 MG: 2 INJECTION, SOLUTION INTRAMUSCULAR; INTRAVENOUS; SUBCUTANEOUS at 12:07

## 2021-11-18 RX ADMIN — HYDROCODONE BITARTRATE AND ACETAMINOPHEN 1 TABLET: 10; 325 TABLET ORAL at 22:26

## 2021-11-18 RX ADMIN — IBUPROFEN 600 MG: 600 TABLET ORAL at 18:13

## 2021-11-18 RX ADMIN — ROCURONIUM BROMIDE 50 MG: 10 INJECTION INTRAVENOUS at 10:34

## 2021-11-18 RX ADMIN — LIDOCAINE HYDROCHLORIDE 80 MG: 20 INJECTION, SOLUTION INFILTRATION; PERINEURAL at 10:34

## 2021-11-18 RX ADMIN — HYDROCODONE BITARTRATE AND ACETAMINOPHEN 1 TABLET: 10; 325 TABLET ORAL at 18:11

## 2021-11-18 RX ADMIN — KETOROLAC TROMETHAMINE 30 MG: 30 INJECTION, SOLUTION INTRAMUSCULAR; INTRAVENOUS at 11:59

## 2021-11-18 RX ADMIN — SODIUM CHLORIDE, POTASSIUM CHLORIDE, SODIUM LACTATE AND CALCIUM CHLORIDE: 600; 310; 30; 20 INJECTION, SOLUTION INTRAVENOUS at 10:29

## 2021-11-18 RX ADMIN — IBUPROFEN 600 MG: 600 TABLET ORAL at 15:45

## 2021-11-18 RX ADMIN — EPHEDRINE SULFATE 5 MG: 50 INJECTION INTRAVENOUS at 11:03

## 2021-11-18 RX ADMIN — SUGAMMADEX 200 MG: 100 INJECTION, SOLUTION INTRAVENOUS at 12:19

## 2021-11-18 RX ADMIN — HYDROMORPHONE HYDROCHLORIDE 0.25 MG: 1 INJECTION, SOLUTION INTRAMUSCULAR; INTRAVENOUS; SUBCUTANEOUS at 12:56

## 2021-11-18 RX ADMIN — PROPOFOL 150 MG: 10 INJECTION, EMULSION INTRAVENOUS at 10:34

## 2021-11-18 RX ADMIN — SODIUM CHLORIDE, PRESERVATIVE FREE 3 ML: 5 INJECTION INTRAVENOUS at 15:52

## 2021-11-18 RX ADMIN — FENTANYL CITRATE 100 MCG: 50 INJECTION, SOLUTION INTRAMUSCULAR; INTRAVENOUS at 10:34

## 2021-11-18 RX ADMIN — ACETAMINOPHEN 1000 MG: 500 TABLET ORAL at 09:00

## 2021-11-18 RX ADMIN — ONDANSETRON 4 MG: 2 INJECTION INTRAMUSCULAR; INTRAVENOUS at 11:32

## 2021-11-18 RX ADMIN — MEPERIDINE HYDROCHLORIDE 12.5 MG: 25 INJECTION INTRAMUSCULAR; INTRAVENOUS; SUBCUTANEOUS at 12:43

## 2021-11-18 RX ADMIN — KETAMINE HYDROCHLORIDE 20 MG: 50 INJECTION, SOLUTION INTRAMUSCULAR; INTRAVENOUS at 10:49

## 2021-11-18 RX ADMIN — ACETAMINOPHEN 650 MG: 325 TABLET ORAL at 20:50

## 2021-11-18 RX ADMIN — OXYCODONE HYDROCHLORIDE 5 MG: 5 TABLET ORAL at 12:56

## 2021-11-18 RX ADMIN — SODIUM CHLORIDE, POTASSIUM CHLORIDE, SODIUM LACTATE AND CALCIUM CHLORIDE 9 ML/HR: 600; 310; 30; 20 INJECTION, SOLUTION INTRAVENOUS at 09:00

## 2021-11-18 RX ADMIN — MIDAZOLAM HYDROCHLORIDE 2 MG: 1 INJECTION, SOLUTION INTRAMUSCULAR; INTRAVENOUS at 09:58

## 2021-11-18 RX ADMIN — SODIUM CHLORIDE, PRESERVATIVE FREE 3 ML: 5 INJECTION INTRAVENOUS at 20:51

## 2021-11-18 RX ADMIN — ACETAMINOPHEN 650 MG: 325 TABLET ORAL at 15:45

## 2021-11-18 RX ADMIN — HYDROMORPHONE HYDROCHLORIDE 0.25 MG: 1 INJECTION, SOLUTION INTRAMUSCULAR; INTRAVENOUS; SUBCUTANEOUS at 12:44

## 2021-11-18 RX ADMIN — SODIUM CHLORIDE, POTASSIUM CHLORIDE, SODIUM LACTATE AND CALCIUM CHLORIDE 125 ML/HR: 600; 310; 30; 20 INJECTION, SOLUTION INTRAVENOUS at 15:48

## 2021-11-18 RX ADMIN — CEFAZOLIN SODIUM 2 G: 2 INJECTION, SOLUTION INTRAVENOUS at 10:38

## 2021-11-18 RX ADMIN — IBUPROFEN 600 MG: 600 TABLET ORAL at 23:25

## 2021-11-18 RX ADMIN — DEXAMETHASONE SODIUM PHOSPHATE 4 MG: 4 INJECTION INTRA-ARTICULAR; INTRALESIONAL; INTRAMUSCULAR; INTRAVENOUS; SOFT TISSUE at 11:32

## 2021-11-18 RX ADMIN — PREDNISONE 20 MG: 20 TABLET ORAL at 20:50

## 2021-11-18 NOTE — PROGRESS NOTES
"GYN Visit    CC: Discuss surgery    HPI:   36 y.o. who is approximately 3 weeks postop from endometrial ablation and laparoscopic sterilization who presents to discuss hysterectomy.  The patient now is having significant pelvic pain.  She still having some abnormal uterine bleeding as well.  She underwent evaluation for an unusual pathology specimen showing TAMIKA on a D&C.  Colposcopy was normal and ECC negative.  The patient has no new concerns and she still is having significant pain.  She desires to proceed with hysterectomy.    History: PMHx, Meds, Allergies, PSHx, Social Hx, and POBHx all reviewed and updated.    /68   Pulse 79   Ht 162.6 cm (64\")   Wt 64.9 kg (143 lb)   LMP  (LMP Unknown)   Breastfeeding No   BMI 24.55 kg/m²     Physical Exam  Vitals and nursing note reviewed.   Constitutional:       General: She is not in acute distress.     Appearance: Normal appearance. She is not ill-appearing.   HENT:      Head: Atraumatic.      Mouth/Throat:      Mouth: Mucous membranes are moist.      Pharynx: Oropharynx is clear.   Cardiovascular:      Rate and Rhythm: Normal rate and regular rhythm.      Heart sounds: Normal heart sounds.   Pulmonary:      Effort: Pulmonary effort is normal. No respiratory distress.      Breath sounds: Normal breath sounds.   Abdominal:      General: Abdomen is flat. Bowel sounds are normal. There is no distension.      Palpations: Abdomen is soft. There is no mass.      Tenderness: There is no abdominal tenderness. There is no rebound.      Hernia: No hernia is present.   Genitourinary:     Comments: Previous pelvic exam reviewed  Musculoskeletal:         General: No swelling.      Right lower leg: No edema.      Left lower leg: No edema.   Skin:     General: Skin is warm and dry.      Coloration: Skin is not jaundiced or pale.      Findings: No bruising, erythema, lesion or rash.   Neurological:      Mental Status: She is alert and oriented to person, place, and " time.   Psychiatric:         Mood and Affect: Mood normal.         Behavior: Behavior normal.         Thought Content: Thought content normal.           ASSESSMENT AND PLAN:  Diagnoses and all orders for this visit:    1. Pelvic pain (Primary)  Assessment & Plan:  The patient reports continued and significant pelvic pain.  I suspect this is most likely from her adhesions.  Her symptoms are refractory to NSAIDs.  The patient desires hysterectomy.  Continue NSAIDs as needed  We have discussed the risks, benefits, and alternatives to the procedure including the risk of infection, bleeding and hemorrhage, the risk of injury to my structures including bowel, bladder, vasculature, pelvic nerves, the ureters, and other nearby structures.  We discussed the risk of anesthesia, perioperative complications including thromboembolism, myocardial infarction, strokes, and death.  We have discussed the risks that the procedure may need to be converted to an abdominal hysterectomy in order to be completed safely.  We have discussed the changes in the risks of the surgery if were we to convert to an open hysterectomy, as well as the changes in hospitalization and postoperative recovery times.  The patient expresses her understanding of these risks and wishes to proceed.      2. Status post endometrial ablation  Assessment & Plan:  The patient reports she continues to have irregular bleeding, although it is improved.      3. Smoker  Assessment & Plan:  Smoking cessation counseling      4. Dysplasia of cervix, high grade TAMIKA 2  Assessment & Plan:  Discussed the patient's pathology specimen from our D&C showing TAMIKA-2.  Given there was no cervical biopsy I can only conclude that this is some of the upper endocervix showing this lesion.  We discussed that her Pap is normal her colposcopy was normal and an ECC done postoperatively in the office was normal.  I discussed with her that in order to be absolutely sure there is nothing more  concerning in the TAMIKA-2 she would require cervical conization or LEEP.  I discussed that is unlikely we would find more significant pathology that would not be addressed with laparoscopic hysterectomy with cervix removal however it is possible.  We discussed that if malignancy was identified even if it was microinvasive that she could require further treatment including further surgery and/or radiation.  The patient does not wish to have further surgery and wishes to proceed with hysterectomy      5. Pelvic adhesions  Overview:  Dense adhesions of the uterus to the anterior abdominal wall on laparoscopy.      6. Abnormal uterine bleeding (AUB)  Overview:  Now status post endometrial ablation but still having symptoms.    Assessment & Plan:  Patient desires to proceed with hysterectomy        Counseling: Tobacco cessation reviewed.    Follow Up:  Return for after or.    Jeferson Stark MD  11/15/2021

## 2021-11-18 NOTE — OP NOTE
TOTAL LAPAROSCOPIC HYSTERECTOMY  Procedure Report    Patient Name:  Mary Patel  YOB: 1985    Date of Surgery:  11/18/2021  Pre-op Diagnosis:   Pelvic pain [R10.2], pelvic adhesions, abnormal uterine bleeding       Post-Op Diagnosis Codes:     * Pelvic pain [R10.2]     * Pelvic peritoneal adhesions, female [N73.6]     * Abnormal uterine bleeding [N93.9]     * Endometriosis [617]      Procedure(s):  TOTAL LAPAROSCOPIC HYSTERECTOMY; LYSIS OF ADHESIONS; CYSTOSCOPY    Staff:  Surgeon(s):  Jeferson Stark MD Holt, Shannon L, MD         Anesthesia: General    Estimated Blood Loss: 100 mL      Specimen:          Specimens     ID Source Type Tests Collected By Collected At Frozen?    A Uterus with Cervix Tissue · TISSUE PATHOLOGY EXAM   Jeferson Stark MD 11/18/21 1123     Description: CERVIX, UTERUS    Comment: VAGINAL REMOVAL              Findings: There was dense adhesions of the uterus to the anterior abdominal wall with a large defect in the anterior abdominal wall peritoneum.  Also appeared to be a small fascial hernia just inferior to this defect in the peritoneum.  The ovaries were normal bilaterally.  The fallopian tubes are absent bilaterally.  There also appeared to be some small endometriosis implants on the left uterosacral ligament.  The bladder was normal post hysterectomy without evidence of suture or injury to the bladder.  There was effluxing urine noted from each ureter on cystoscopy.    Complications: None    Description of Procedure: After reviewing the informed consent, including the risks, benefits, and alternatives to the procedure, the patient expressed her understanding and desired to proceed.  She was taken to the operating room with an IV in place and running.  She was placed on the operating table in the dorsal supine position.  The patient was given a general anesthetic and intubated endotracheally.  The patient was repositioned into the dorsal lithotomy  position.  Her arms were tucked to the side, and her legs were placed in yellow-fin stirrups.  We took care in positioning both arms and legs, padding any potential pressure points.  After a surgical time-out was performed, the patient was prepped and draped in the usual sterile fashion.      I proceeded below, and placed a sterile Powers catheter.  I inserted a Vergara retractor into the posterior vagina, and a Singer retractor into the anterior vagina.  I used a single-tooth tenaculum to grasp the anterior lip of the cervix.  The uterus was sounded to 9 centimeters.  The cervix was then dilated to 8 mm.  I called for the 3.5 centimeter LORENZO cup, and an 8 centimeter LORENZO tip.  The LORENZO uterine manipulator was assembled in standard fashion.  I placed a 0 Vicryl stay suture at the 12:00 position on the anterior lip of the cervix.  The single-tooth tenaculum was removed, and I placed the LORENZO uterine manipulator in standard fashion.  The Singer and Vergara retractors were removed.  I changed my gloves and proceeded to the abdomen.  After assuring the patient was flat, and had an orogastric tube in place, I infiltrated in the planned incision sites with half percent Marcaine with epinephrine.  I made a vertical infraumbilical skin incision with the scalpel.  With anterior traction on the anterior abdominal wall, I inserted the Veress needle into the peritoneal cavity without difficulty.  Free-flowing saline through the Veress needle confirmed by intraperitoneal location.  The opening pressures were less than 7 mm of mercury.  The abdomen was insufflated to 25 mm of mercury. I used a 5 mm Optiview trocar to enter the peritoneal cavity under direct visualization.  I surveyed the area below the trocar insertion site, and there was no evidence of any bowel or vascular injury.  I placed 5 mm trochars, in the left lower quadrant and right lower quadrant, under direct visualization.  I placed an 11 mm trocar in the suprapubic  location, again under direct visualization.  The patient was placed in steep Trendelenburg, the intra-abdominal pressures were lowered to 15 mm mercury, and the bowel was evacuated out of the pelvis with a blunt probe.     The uterus was densely adherent to the anterior abdominal wall.  There was a defect in the peritoneum of the anterior abdominal wall allowing visualization of the rectus muscle.  The fascia would be intact anterior to this in this location.  Just inferior to this defect in the peritoneum there was noted to be a fascial defect containing some fat consistent with a ventral hernia.  This was small about 2 cm in size.  The ovaries were normal bilaterally.  There was no fallopian tubes present.  There is a small endometriosis implant on the left uterosacral ligament.  The course of the ureter was identified on each side of the pelvis.  The left ovary was tented up.  The left utero-ovarian ligament was then sealed divided.  This plane of dissection was carried up to the left round ligament which was sealed and divided.  I proceeded over to the right side.  The right ovary was tented up.   The right utero-ovarian ligament was sealed divided.  I carried this plane up to the right round ligament which was sealed divided.  Once had both round ligaments divided I then approached the adhesions of the uterus to the anterior abdominal wall.  Using the active blade of the harmonic scalpel I carefully divided the uterus away from the anterior abdominal wall.  I carefully took these adhesions down to ensure that I did not run into the bladder inferiorly.  Once I got the worst of these adhesions down the inferior uterus near the cervix was actually free.  The uterus is now off of the anterior abdominal wall and I could see normal anatomy.   I then took down the anterior portion of the broad ligament on the left side and created a bladder flap.  Once I have the bladder down well, I took down the posterior portion of  the left broad ligament, coming across just above the uterosacral ligaments.  The uterine vessels were then skeletonized on the left side.  I divided the uterine vessels just above the level of the colotomy cup.  I took successive bites inside the initial pedicle lateralizing the uterine pedicle past my colpotomy cup.  The anterior portion of the broad ligament was then taken down on the right side, connecting with the previous bladder flap.  The posterior portion of the broad ligament was taken down.  The uterine vessels were then skeletonized on the right.  The vessels were sealed divided.  Successive bites were then taken inside the initial pedicle lateralizing the vessels past the colpotomy cup.  Once all of my pedicles were taken I then used the active blade the harmonic scalpel to amputate the uterus and cervix from the vagina, by making a circumferential colpotomy right on the edge of the colpotomy cup.  The uterus and cervix were extracted through the vagina.      I then closed the vaginal cuff with a 2-0 V-Loc suture in a running fashion.  I started at the right vaginal angle worked across to the left vaginal angle.  I was sure to incorporate a good amount of uterosacral ligament for vaginal support.  Once I made it to the right vaginal angle came back across incorporating a second layer of closure, ending near the right vaginal angle once again.     After the vaginal cuff was closed, the pelvis was copiously irrigated. I surveyed all of my pedicles for any potential bleeding.  The abdominal pressures were lowered to 5 mm of mercury, and another systematic survey of all of my pedicles did not reveal any bleeding. I removed the intra-abdominal instruments, and proceeded below to perform cystoscopy.  The patient's Powers catheter was back- clamped, and the bladder was backfilled with 150 mL of sterile saline.  The Powers catheter was then removed, and using a 5 mm 30° laparoscope, I was surveyed the bladder.   There was no evidence of any bladder injury or suture within the bladder.  I visualized both ureters and saw good spill of urine from each side.  The laparoscope was removed from the bladder, the catheter was reinserted, and the back clamp was removed.  I removed the blue bulb from the vagina.  I used an open sided speculum to inspect the vaginal cuff.  The vaginal cuff was well approximated, with good apical support.  The vaginal cuff was copiously irrigated with sterile saline, and blotted dry with a sponge stick.  There was no evidence  of any bleeding from the vaginal cuff.  The open sided speculum was removed, I changed my gloves, and proceeded back to the abdomen.      The abdominal cavity was re-insufflated to 15 mmHg.  A 5 mm 0° laparoscope was then reinserted into the umbilical trocar, and the surgical bed was again reinspected.  There was no evidence of any bleeding at this time, so I began to close the 11 mm port site.  With the aid of the Maggie Endo Close the suprapubic port site fascia was closed with a 0 Vicryl suture under direct visualization.  I removed the umbilical and right lower quadrant trochars under direct visualization.  The camera was removed.  Finally, the seat to the left lower quadrant trocar was removed, allowing evacuation of all the patient's pneumoperitoneum.  Once the pneumoperitoneum was evacuated, the trocar cannula was removed.  The skin sites were then reapproximated with simple, interrupted, subcuticular 4-0 Monocryl sutures.  The incisions were then reinforced with Mastisol and Steri-Strips, and covered with a coverlet.  The patient was taken out of lithotomy position, awoken from her general anesthesia, and taken to the recovery room in satisfactory condition.  All counts were correct x2, and the patient received Kefzol as her preoperative antibiotics.    Dr. Cathy Cabral was the assistant for the procedure and participated in holding the camera, retraction, suction  and irrigation, ligated uterine artery on her side.      Jeferson Stark MD     Date: 11/18/2021  Time: 16:20 EST

## 2021-11-18 NOTE — ASSESSMENT & PLAN NOTE
The patient reports continued and significant pelvic pain.  I suspect this is most likely from her adhesions.  Her symptoms are refractory to NSAIDs.  The patient desires hysterectomy.  Continue NSAIDs as needed  We have discussed the risks, benefits, and alternatives to the procedure including the risk of infection, bleeding and hemorrhage, the risk of injury to my structures including bowel, bladder, vasculature, pelvic nerves, the ureters, and other nearby structures.  We discussed the risk of anesthesia, perioperative complications including thromboembolism, myocardial infarction, strokes, and death.  We have discussed the risks that the procedure may need to be converted to an abdominal hysterectomy in order to be completed safely.  We have discussed the changes in the risks of the surgery if were we to convert to an open hysterectomy, as well as the changes in hospitalization and postoperative recovery times.  The patient expresses her understanding of these risks and wishes to proceed.

## 2021-11-18 NOTE — DISCHARGE INSTRUCTIONS
No driving while taking narcotic pain medications  No lifting more than 15 to 20 pounds for 1 to 2 weeks  No intercourse until follow-up  Keep the incisions clean and dry  Remove outer bandage 24 hours after surgery  Remove inner bandage/Steri-Strips 1 week from surgery  Call for temperature greater than 100 °F, shortness of breath or chest pain, heavy vaginal bleeding soaking a pad in less than 1 hour, redness swelling or drainage from the incisions, excessive nausea or vomiting, or pain that is worsening despite current pain medications

## 2021-11-18 NOTE — ASSESSMENT & PLAN NOTE
Discussed the patient's pathology specimen from our D&C showing TAMIKA-2.  Given there was no cervical biopsy I can only conclude that this is some of the upper endocervix showing this lesion.  We discussed that her Pap is normal her colposcopy was normal and an ECC done postoperatively in the office was normal.  I discussed with her that in order to be absolutely sure there is nothing more concerning in the TAMIKA-2 she would require cervical conization or LEEP.  I discussed that is unlikely we would find more significant pathology that would not be addressed with laparoscopic hysterectomy with cervix removal however it is possible.  We discussed that if malignancy was identified even if it was microinvasive that she could require further treatment including further surgery and/or radiation.  The patient does not wish to have further surgery and wishes to proceed with hysterectomy

## 2021-11-18 NOTE — ANESTHESIA PREPROCEDURE EVALUATION
Anesthesia Evaluation     Patient summary reviewed and Nursing notes reviewed   no history of anesthetic complications:  NPO Solid Status: > 8 hours  NPO Liquid Status: > 2 hours           Airway   Mallampati: II  TM distance: >3 FB  Neck ROM: full  No difficulty expected  Dental    (+) partials    Pulmonary - negative pulmonary ROS and normal exam    breath sounds clear to auscultation  Cardiovascular - negative cardio ROS and normal exam  Exercise tolerance: good (4-7 METS)    Rhythm: regular        Neuro/Psych  (+) headaches,     GI/Hepatic/Renal/Endo    (+)  GERD,      Musculoskeletal (-) negative ROS    Abdominal    Substance History - negative use     OB/GYN negative ob/gyn ROS         Other      history of cancer                    Anesthesia Plan    ASA 2     general   (Patient understands anesthesia not responsible for dental damage.)  intravenous induction     Anesthetic plan, all risks, benefits, and alternatives have been provided, discussed and informed consent has been obtained with: patient.    Plan discussed with CRNA.

## 2021-11-18 NOTE — H&P
[facility]   HISTORY AND PHYSICAL    Patient Name: Mary Patel  : 1985  MRN: [id number]  Primary Care Physician:  Margaret Laughlin MD  Date of admission: 2021    Subjective   Subjective     Chief Complaint: Pelvic pain    HPI:    Mary Patel is a 36 y.o. female who recently underwent diagnostic hysteroscopy, dilatation curettage and endometrial ablation for abnormal uterine bleeding, and permanent sterilization.  Patient did report having some dysmenorrhea prior to that but since the procedure she has had significant pelvic pain.  At the time of her sterilization it was noted that that she had dense pelvic adhesions of the uterus to the anterior abdominal wall.  The patient's pain is refractory to nonsteroidal anti-inflammatories.  She feels it is affecting her ability to work and function.  She is interested in proceeding with hysterectomy with ovarian conservation.  On the pathology of her D&C it was noted there was some fragments of TAMIKA-2 on the curettage specimen.  The patient has had a normal Pap smear, a normal colposcopy and a negative ECC in the office.  We discussed at length the TAMIKA-2.  I discussed with her a diagnostic excision procedure for confirmation that nothing more concerning than TAMIKA-2 was present.  The patient does not wish to proceed with another surgery prior to hysterectomy.  We did discuss that if something more worrisome such as malignancy were noted at the time of the hysterectomy she would require further treatment including the possibility of further surgery and/or radiation.  The patient feels this is an acceptable risk and wished to proceed with hysterectomy.    Review of Systems   All systems were reviewed and negative except for: Irregular menses, pelvic pain, painful menses, painful intercourse    Personal History     Past Medical History:   Diagnosis Date   • Colon polyps    • GERD (gastroesophageal reflux disease)    • Migraine    • Pelvic pain         Past Surgical History:   Procedure Laterality Date   •  SECTION     • CHOLECYSTECTOMY     • COLONOSCOPY     • D & C HYSTEROSCOPY ENDOMETRIAL ABLATION N/A 2021    Procedure: DILATATION AND CURETTAGE HYSTEROSCOPY NOVASURE ENDOMETRIAL ABLATION;  Surgeon: Jeferson Stark MD;  Location: [facility];  Service: Gynecology;  Laterality: N/A;   • ENDOSCOPY     • SALPINGECTOMY Bilateral 2021    Procedure: LYSIS OF ADHESIONS, SALPINGECTOMY LAPAROSCOPIC;  Surgeon: Jeferson Stark MD;  Location: [facility] OR;  Service: Gynecology;  Laterality: Bilateral;   • TUBAL ABDOMINAL LIGATION         Family History: family history includes Breast cancer in her paternal grandmother; Colon cancer in her maternal grandfather. Otherwise pertinent FHx was reviewed and not pertinent to current issue.    Social History:  reports that she has been smoking cigarettes. She has been smoking about 0.50 packs per day. She has never used smokeless tobacco. She reports that she does not drink alcohol and does not use drugs.    Home Medications:  esomeprazole, naproxen, and predniSONE      Allergies:  Allergies   Allergen Reactions   • Morphine Anaphylaxis       Objective   Objective     Vitals:   Temp:  [98.3 °F (36.8 °C)] 98.3 °F (36.8 °C)  Heart Rate:  [66] 66  Resp:  [16] 16  BP: (103)/(57) 103/57  Physical Exam    Constitutional: Awake, alert   Eyes: PERRLA, sclerae anicteric, no conjunctival injection   HENT: NCAT, mucous membranes moist   Neck: Supple, no thyromegaly, no lymphadenopathy, trachea midline   Respiratory: Clear to auscultation bilaterally, nonlabored respirations    Cardiovascular: RRR, no murmurs, rubs, or gallops, palpable pedal pulses bilaterally   Gastrointestinal: Positive bowel sounds, soft, nontender, nondistended              Genitourinary: Normal external genitalia, vagina, and cervix.  The uterus is approximately 8 cm in size mobile but with tenderness to palpation.  There is more  tenderness with manipulation of the cervix and uterus.  There are no palpable adnexal masses.   Musculoskeletal: No bilateral ankle edema, no clubbing or cyanosis to extremities   Psychiatric: Appropriate affect, cooperative   Neurologic: Oriented x 3, strength symmetric in all extremities, speech clear   Skin: No rashes     Result Review    Result Review:  I have personally reviewed the results from the time of this admission to 11/18/2021 10:14 EST and agree with these findings:  [x]  Laboratory  []  Microbiology  [x]  Radiology  []  EKG/Telemetry   []  Cardiology/Vascular   [x]  Pathology  [x]  Old records  []  Other:      Assessment/Plan   Assessment / Plan   Assessment:  1.  Chronic pelvic pain  2.  Dysmenorrhea  3.  Abnormal uterine bleeding  4.  TAMIKA-2    Plan:   As per the HPI the patient has had refractory symptoms after medical therapy and conservative surgical therapy.  Her symptoms are negatively impacting her life and she desires to proceed with hysterectomy.  As laid out in the HPI we discussed the diagnosis of the TAMIKA-2 which was on a D&C specimen but was felt to be of cervical origin.  Due to this I can only conclude this must be an endocervical lesion.  She did undergo colposcopy, which was normal and an aggressive ECC which was negative.  We discussed the option for a diagnostic excisional procedure to ensure nothing further was present however the patient wishes to forego this and proceed directly to hysterectomy.  We did discuss the possibility of the need for further surgery and/or treatment such as radiation if a malignancy were found on the final pathology.  The patient expressed her understanding of this possibility.  We have discussed the risks, benefits, and alternatives to the procedure including the risk of infection, bleeding and hemorrhage, the risk of injury to my structures including bowel, bladder, vasculature, pelvic nerves, the ureters, and other nearby structures.  We discussed the  risk of anesthesia, perioperative complications including thromboembolism, myocardial infarction, strokes, and death.  We have discussed the risks that the procedure may need to be converted to an abdominal hysterectomy in order to be completed safely.  We have discussed the changes in the risks of the surgery if were we to convert to an open hysterectomy, as well as the changes in hospitalization and postoperative recovery times.  The patient expresses her understanding of these risks and wishes to proceed.      Electronically signed by Jeferson Stark MD, 11/17/21, 9:10 PM EST.

## 2021-11-18 NOTE — H&P (VIEW-ONLY)
[facility]   HISTORY AND PHYSICAL    Patient Name: Mary Patel  : 1985  MRN: [id number]  Primary Care Physician:  Margaret Laughlin MD  Date of admission: (Not on file)    Subjective   Subjective     Chief Complaint: Pelvic pain    HPI:    Mary Patel is a 36 y.o. female who recently underwent diagnostic hysteroscopy, dilatation curettage and endometrial ablation for abnormal uterine bleeding, and permanent sterilization.  Patient did report having some dysmenorrhea prior to that but since the procedure she has had significant pelvic pain.  At the time of her sterilization it was noted that that she had dense pelvic adhesions of the uterus to the anterior abdominal wall.  The patient's pain is refractory to nonsteroidal anti-inflammatories.  She feels it is affecting her ability to work and function.  She is interested in proceeding with hysterectomy with ovarian conservation.  On the pathology of her D&C it was noted there was some fragments of TAMIKA-2 on the curettage specimen.  The patient has had a normal Pap smear, a normal colposcopy and a negative ECC in the office.  We discussed at length the TAMIKA-2.  I discussed with her a diagnostic excision procedure for confirmation that nothing more concerning than TAMIKA-2 was present.  The patient does not wish to proceed with another surgery prior to hysterectomy.  We did discuss that if something more worrisome such as malignancy were noted at the time of the hysterectomy she would require further treatment including the possibility of further surgery and/or radiation.  The patient feels this is an acceptable risk and wished to proceed with hysterectomy.    Review of Systems   All systems were reviewed and negative except for: Irregular menses, pelvic pain, painful menses, painful intercourse    Personal History     Past Medical History:   Diagnosis Date   • Colon polyps    • GERD (gastroesophageal reflux disease)    • Migraine    • Pelvic pain         Past Surgical History:   Procedure Laterality Date   •  SECTION     • CHOLECYSTECTOMY     • COLONOSCOPY     • D & C HYSTEROSCOPY ENDOMETRIAL ABLATION N/A 2021    Procedure: DILATATION AND CURETTAGE HYSTEROSCOPY NOVASURE ENDOMETRIAL ABLATION;  Surgeon: Jeferson Stark MD;  Location: [facility];  Service: Gynecology;  Laterality: N/A;   • ENDOSCOPY     • SALPINGECTOMY Bilateral 2021    Procedure: LYSIS OF ADHESIONS, SALPINGECTOMY LAPAROSCOPIC;  Surgeon: Jeferson Stark MD;  Location: [facility] OR;  Service: Gynecology;  Laterality: Bilateral;   • TUBAL ABDOMINAL LIGATION         Family History: family history includes Breast cancer in her paternal grandmother; Colon cancer in her maternal grandfather. Otherwise pertinent FHx was reviewed and not pertinent to current issue.    Social History:  reports that she has been smoking cigarettes. She has been smoking about 0.50 packs per day. She has never used smokeless tobacco. She reports that she does not drink alcohol and does not use drugs.    Home Medications:  Unable to find, naproxen, and predniSONE      Allergies:  Allergies   Allergen Reactions   • Morphine Anaphylaxis       Objective   Objective     Vitals:      Physical Exam    Constitutional: Awake, alert   Eyes: PERRLA, sclerae anicteric, no conjunctival injection   HENT: NCAT, mucous membranes moist   Neck: Supple, no thyromegaly, no lymphadenopathy, trachea midline   Respiratory: Clear to auscultation bilaterally, nonlabored respirations    Cardiovascular: RRR, no murmurs, rubs, or gallops, palpable pedal pulses bilaterally   Gastrointestinal: Positive bowel sounds, soft, nontender, nondistended              Genitourinary: Normal external genitalia, vagina, and cervix.  The uterus is approximately 8 cm in size mobile but with tenderness to palpation.  There is more tenderness with manipulation of the cervix and uterus.  There are no palpable adnexal  masses.   Musculoskeletal: No bilateral ankle edema, no clubbing or cyanosis to extremities   Psychiatric: Appropriate affect, cooperative   Neurologic: Oriented x 3, strength symmetric in all extremities, speech clear   Skin: No rashes     Result Review    Result Review:  I have personally reviewed the results from the time of this admission to 11/17/2021 21:14 EST and agree with these findings:  [x]  Laboratory  []  Microbiology  [x]  Radiology  []  EKG/Telemetry   []  Cardiology/Vascular   [x]  Pathology  [x]  Old records  []  Other:      Assessment/Plan   Assessment / Plan   Assessment:  1.  Chronic pelvic pain  2.  Dysmenorrhea  3.  Abnormal uterine bleeding  4.  TAMIKA-2    Plan:   As per the HPI the patient has had refractory symptoms after medical therapy and conservative surgical therapy.  Her symptoms are negatively impacting her life and she desires to proceed with hysterectomy.  As laid out in the HPI we discussed the diagnosis of the TAMIKA-2 which was on a D&C specimen but was felt to be of cervical origin.  Due to this I can only conclude this must be an endocervical lesion.  She did undergo colposcopy, which was normal and an aggressive ECC which was negative.  We discussed the option for a diagnostic excisional procedure to ensure nothing further was present however the patient wishes to forego this and proceed directly to hysterectomy.  We did discuss the possibility of the need for further surgery and/or treatment such as radiation if a malignancy were found on the final pathology.  The patient expressed her understanding of this possibility.  We have discussed the risks, benefits, and alternatives to the procedure including the risk of infection, bleeding and hemorrhage, the risk of injury to my structures including bowel, bladder, vasculature, pelvic nerves, the ureters, and other nearby structures.  We discussed the risk of anesthesia, perioperative complications including thromboembolism, myocardial  infarction, strokes, and death.  We have discussed the risks that the procedure may need to be converted to an abdominal hysterectomy in order to be completed safely.  We have discussed the changes in the risks of the surgery if were we to convert to an open hysterectomy, as well as the changes in hospitalization and postoperative recovery times.  The patient expresses her understanding of these risks and wishes to proceed.      Electronically signed by Jeferson Stark MD, 11/17/21, 9:10 PM EST.

## 2021-11-18 NOTE — PLAN OF CARE
Goal Outcome Evaluation:  Plan of Care Reviewed With: patient        Progress: improving  Outcome Summary: Patient admitted to the floor today after a hysterectomy. Patient still has astudillo in place, with orders to remove at 2100. Patient is tolerating fluids well and has been encouraged to ambulate and eat. Patient states her pain has been under control. Patient is hopeful to discharge home tomorrow.

## 2021-11-18 NOTE — ANESTHESIA POSTPROCEDURE EVALUATION
Patient: Mary Patel    Procedure Summary     Date: 11/18/21 Room / Location: [facility] OR 05 / [facility] MAIN OR    Anesthesia Start: 1031 Anesthesia Stop: 1229    Procedure: TOTAL LAPAROSCOPIC HYSTERECTOMY; LYSIS OF ADHESIONS; CYSTOSCOPY (N/A Abdomen) Diagnosis:       Pelvic pain      (Pelvic pain [R10.2])    Surgeons: Jefreson Stark MD Provider: Renee Morales DO    Anesthesia Type: general ASA Status: 2          Anesthesia Type: general    Vitals  Vitals Value Taken Time   /69 11/18/21 1310   Temp 36.9 °C (98.4 °F) 11/18/21 1310   Pulse 58 11/18/21 1310   Resp 13 11/18/21 1310   SpO2 97 % 11/18/21 1310           Post Anesthesia Care and Evaluation    Patient location during evaluation: bedside  Patient participation: complete - patient participated  Level of consciousness: awake and alert  Pain management: adequate  Airway patency: patent  Anesthetic complications: No anesthetic complications  PONV Status: none  Cardiovascular status: acceptable  Respiratory status: acceptable  Hydration status: acceptable

## 2021-11-19 VITALS
HEART RATE: 54 BPM | HEIGHT: 64 IN | RESPIRATION RATE: 18 BRPM | OXYGEN SATURATION: 97 % | SYSTOLIC BLOOD PRESSURE: 103 MMHG | DIASTOLIC BLOOD PRESSURE: 52 MMHG | WEIGHT: 140.65 LBS | TEMPERATURE: 97.9 F | BODY MASS INDEX: 24.01 KG/M2

## 2021-11-19 LAB
BASOPHILS # BLD AUTO: 0.02 10*3/MM3 (ref 0–0.2)
BASOPHILS NFR BLD AUTO: 0.1 % (ref 0–1.5)
DEPRECATED RDW RBC AUTO: 40.6 FL (ref 37–54)
EOSINOPHIL # BLD AUTO: 0 10*3/MM3 (ref 0–0.4)
EOSINOPHIL NFR BLD AUTO: 0 % (ref 0.3–6.2)
ERYTHROCYTE [DISTWIDTH] IN BLOOD BY AUTOMATED COUNT: 11.9 % (ref 12.3–15.4)
HCT VFR BLD AUTO: 35.4 % (ref 34–46.6)
HGB BLD-MCNC: 12.2 G/DL (ref 12–15.9)
IMM GRANULOCYTES # BLD AUTO: 0.09 10*3/MM3 (ref 0–0.05)
IMM GRANULOCYTES NFR BLD AUTO: 0.6 % (ref 0–0.5)
LYMPHOCYTES # BLD AUTO: 0.99 10*3/MM3 (ref 0.7–3.1)
LYMPHOCYTES NFR BLD AUTO: 6.9 % (ref 19.6–45.3)
MCH RBC QN AUTO: 32.1 PG (ref 26.6–33)
MCHC RBC AUTO-ENTMCNC: 34.5 G/DL (ref 31.5–35.7)
MCV RBC AUTO: 93.2 FL (ref 79–97)
MONOCYTES # BLD AUTO: 0.74 10*3/MM3 (ref 0.1–0.9)
MONOCYTES NFR BLD AUTO: 5.1 % (ref 5–12)
NEUTROPHILS NFR BLD AUTO: 12.61 10*3/MM3 (ref 1.7–7)
NEUTROPHILS NFR BLD AUTO: 87.3 % (ref 42.7–76)
NRBC BLD AUTO-RTO: 0 /100 WBC (ref 0–0.2)
PLATELET # BLD AUTO: 245 10*3/MM3 (ref 140–450)
PMV BLD AUTO: 10.2 FL (ref 6–12)
RBC # BLD AUTO: 3.8 10*6/MM3 (ref 3.77–5.28)
WBC NRBC COR # BLD: 14.45 10*3/MM3 (ref 3.4–10.8)

## 2021-11-19 PROCEDURE — 85025 COMPLETE CBC W/AUTO DIFF WBC: CPT | Performed by: OBSTETRICS & GYNECOLOGY

## 2021-11-19 PROCEDURE — 63710000001 PREDNISONE PER 1 MG: Performed by: OBSTETRICS & GYNECOLOGY

## 2021-11-19 RX ORDER — HYDROCODONE BITARTRATE AND ACETAMINOPHEN 5; 325 MG/1; MG/1
1-2 TABLET ORAL EVERY 6 HOURS PRN
Qty: 26 TABLET | Refills: 0 | Status: SHIPPED | OUTPATIENT
Start: 2021-11-19 | End: 2022-01-05

## 2021-11-19 RX ORDER — AMOXICILLIN 250 MG
1 CAPSULE ORAL DAILY
Qty: 30 TABLET | Refills: 1 | Status: SHIPPED | OUTPATIENT
Start: 2021-11-19 | End: 2022-01-05

## 2021-11-19 RX ORDER — MEDROXYPROGESTERONE ACETATE 150 MG/ML
150 INJECTION, SUSPENSION INTRAMUSCULAR ONCE
Status: COMPLETED | OUTPATIENT
Start: 2021-11-19 | End: 2021-11-19

## 2021-11-19 RX ORDER — IBUPROFEN 600 MG/1
600 TABLET ORAL EVERY 6 HOURS PRN
Qty: 60 TABLET | Refills: 1 | Status: SHIPPED | OUTPATIENT
Start: 2021-11-19

## 2021-11-19 RX ADMIN — HYDROCODONE BITARTRATE AND ACETAMINOPHEN 1 TABLET: 10; 325 TABLET ORAL at 04:42

## 2021-11-19 RX ADMIN — IBUPROFEN 600 MG: 600 TABLET ORAL at 06:10

## 2021-11-19 RX ADMIN — PREDNISONE 20 MG: 20 TABLET ORAL at 08:10

## 2021-11-19 RX ADMIN — ACETAMINOPHEN 650 MG: 325 TABLET ORAL at 01:32

## 2021-11-19 RX ADMIN — PANTOPRAZOLE SODIUM 40 MG: 40 TABLET, DELAYED RELEASE ORAL at 06:10

## 2021-11-19 RX ADMIN — ACETAMINOPHEN 650 MG: 325 TABLET ORAL at 08:10

## 2021-11-19 RX ADMIN — SODIUM CHLORIDE, PRESERVATIVE FREE 3 ML: 5 INJECTION INTRAVENOUS at 08:12

## 2021-11-19 RX ADMIN — MEDROXYPROGESTERONE ACETATE 150 MG: 150 INJECTION, SUSPENSION INTRAMUSCULAR at 09:25

## 2021-11-19 NOTE — PLAN OF CARE
Goal Outcome Evaluation:           Progress: improving  Outcome Summary: Continues to improve. Up ambulating in hallway this shift. Tolerating PO pain meds. Anxious to discharge.

## 2021-11-19 NOTE — PROGRESS NOTES
"  Subjective     Patient reports her pain is controlled.  She is tolerating regular diet.  She is ambulating without difficulty.  She is voiding without difficulty.  Powers was removed yesterday evening.  She has no vaginal bleeding.  She denies nausea or vomiting.  She desires to be discharged.    Objective     Physical Exam:  Incision: dressed, slightly stained with blood   Lungs: clear to auscultation   Abdomen: abdomen is soft without significant tenderness, masses, organomegaly or guarding.   Extremities:  extremities normal, atraumatic, no cyanosis or edema           Vital Sign Min/Max    Temp  Min: 97.7 °F (36.5 °C)  Max: 100.2 °F (37.9 °C)   Pulse  Min: 54  Max: 112   Resp  Min: 12  Max: 18   BP  Min: 101/48  Max: 119/57   No data recorded   SpO2  Min: 96 %  Max: 100 %   No data recorded     Flowsheet Rows      First Filed Value   Admission Height 162.6 cm (64\") Documented at 11/04/2021 1123   Admission Weight 62.6 kg (138 lb) Documented at 11/04/2021 1123          Intake/Output last 24 hours:    Intake/Output Summary (Last 24 hours) at 11/19/2021 0812  Last data filed at 11/18/2021 1823  Gross per 24 hour   Intake 1845 ml   Output 725 ml   Net 1120 ml       Intake/Output this shift:  No intake/output data recorded.    Labs/Studies reviewed:  Yes   Medications reviewed:   Yes     Assessment/Plan       S/P laparoscopic hysterectomy    Abnormal uterine bleeding (AUB)    Pelvic adhesions    Pelvic pain    Dysplasia of cervix, high grade TAMIKA 2    Smoker      Post op day 1 Procedure(s):  TOTAL LAPAROSCOPIC HYSTERECTOMY; LYSIS OF ADHESIONS; CYSTOSCOPY Doing well postoperatively..    Plan:  remove dressing, continue post op care and plan for discharge today   Postop instructions reviewed.  Follow-up scheduled.  Discharge medications reviewed.              Jeferson Stark MD  11/19/21  08:12 EST          "

## 2021-12-07 ENCOUNTER — OFFICE VISIT (OUTPATIENT)
Dept: OBSTETRICS AND GYNECOLOGY | Facility: CLINIC | Age: 36
End: 2021-12-07

## 2021-12-07 VITALS
BODY MASS INDEX: 24.75 KG/M2 | SYSTOLIC BLOOD PRESSURE: 119 MMHG | HEART RATE: 73 BPM | WEIGHT: 145 LBS | DIASTOLIC BLOOD PRESSURE: 74 MMHG | HEIGHT: 64 IN

## 2021-12-07 DIAGNOSIS — Z90.710 S/P LAPAROSCOPIC HYSTERECTOMY: ICD-10-CM

## 2021-12-07 DIAGNOSIS — Z48.89 POSTOPERATIVE VISIT: Primary | ICD-10-CM

## 2021-12-07 DIAGNOSIS — F17.200 SMOKER: Chronic | ICD-10-CM

## 2021-12-07 DIAGNOSIS — N87.1 DYSPLASIA OF CERVIX, HIGH GRADE CIN 2: ICD-10-CM

## 2021-12-07 PROCEDURE — 99024 POSTOP FOLLOW-UP VISIT: CPT | Performed by: OBSTETRICS & GYNECOLOGY

## 2021-12-07 NOTE — PROGRESS NOTES
"Post Operative Visit      CC: Post operative follow up    HPI:   Pain:  Yes and mild and controlled with medication  Vaginal bleeding:  No  Vaginal discharge:  No  Fever/chills:  No  Good appetite:  Yes  Normal bladder function:  Yes  Normal bowel function:  Yes  Hot flashes: No    Operative report, surgical findings and any pathology reviewed.    /74   Pulse 73   Ht 162.6 cm (64\")   Wt 65.8 kg (145 lb)   LMP  (LMP Unknown)   Breastfeeding No   BMI 24.89 kg/m²     Physical Exam  Constitutional:       General: She is not in acute distress.     Appearance: Normal appearance. She is normal weight. She is not ill-appearing.   Abdominal:      General: Abdomen is flat. Bowel sounds are normal. There is no distension.      Palpations: Abdomen is soft. There is no mass.      Tenderness: There is no abdominal tenderness. There is no guarding or rebound.      Hernia: No hernia is present.      Comments: The incisions are clean, dry, intact and well-healing.  There is no signs of infection.   Musculoskeletal:         General: No swelling.      Right lower leg: No edema.      Left lower leg: No edema.   Skin:     General: Skin is warm and dry.      Findings: No rash.   Neurological:      Mental Status: She is alert and oriented to person, place, and time.   Psychiatric:         Mood and Affect: Mood normal.         Behavior: Behavior normal.         Thought Content: Thought content normal.           ASSESSMENT:  Post-operative exam    Diagnoses and all orders for this visit:    1. Postoperative visit (Primary)  Assessment & Plan:  Stable postoperative course  Continue Motrin as needed for pain  May drive  May tub bath  Continue lifting restrictions  Continue pelvic rest  Follow-up in 3 to 4 weeks      2. S/P laparoscopic hysterectomy  Assessment & Plan:  Stable postoperative course  Reviewed pathology and surgical photos with the patient      3. Dysplasia of cervix, high grade TAMIKA 2  Assessment & Plan:  TAMIKA-2 was on " the final pathology.  All margins were negative.  Discussed the patient she would need to continue with Pap smears post hysterectomy at least yearly.      4. Smoker  Assessment & Plan:  Smoking cessation counseling      PLAN:    Any available photos and/or pathology were reviewed.  All questions answered.     Follow Up:  Return in about 4 weeks (around 1/4/2022) for Recheck.    Jeferson Stark MD  12/07/2021

## 2021-12-08 PROBLEM — N93.9 ABNORMAL UTERINE BLEEDING (AUB): Chronic | Status: RESOLVED | Noted: 2021-08-11 | Resolved: 2021-12-08

## 2021-12-08 PROBLEM — G89.29 CHRONIC PELVIC PAIN IN FEMALE: Status: RESOLVED | Noted: 2021-11-17 | Resolved: 2021-12-08

## 2021-12-08 PROBLEM — H69.83 DYSFUNCTION OF BOTH EUSTACHIAN TUBES: Status: RESOLVED | Noted: 2021-11-18 | Resolved: 2021-12-08

## 2021-12-08 PROBLEM — H69.93 DYSFUNCTION OF BOTH EUSTACHIAN TUBES: Status: RESOLVED | Noted: 2021-11-18 | Resolved: 2021-12-08

## 2021-12-08 PROBLEM — R10.2 CHRONIC PELVIC PAIN IN FEMALE: Status: RESOLVED | Noted: 2021-11-17 | Resolved: 2021-12-08

## 2021-12-08 PROBLEM — R10.2 PELVIC PAIN: Chronic | Status: RESOLVED | Noted: 2021-09-14 | Resolved: 2021-12-08

## 2021-12-08 PROBLEM — N73.6 PELVIC ADHESIONS: Chronic | Status: RESOLVED | Noted: 2021-08-26 | Resolved: 2021-12-08

## 2021-12-08 PROBLEM — Z48.89 POSTOPERATIVE VISIT: Status: ACTIVE | Noted: 2021-12-08

## 2021-12-08 PROBLEM — N94.6 DYSMENORRHEA: Status: RESOLVED | Noted: 2021-11-17 | Resolved: 2021-12-08

## 2021-12-08 PROBLEM — H65.90 NON-SUPPURATIVE OTITIS MEDIA: Status: RESOLVED | Noted: 2021-11-18 | Resolved: 2021-12-08

## 2021-12-08 NOTE — ASSESSMENT & PLAN NOTE
TAMIKA-2 was on the final pathology.  All margins were negative.  Discussed the patient she would need to continue with Pap smears post hysterectomy at least yearly.

## 2021-12-08 NOTE — ASSESSMENT & PLAN NOTE
Stable postoperative course  Continue Motrin as needed for pain  May drive  May tub bath  Continue lifting restrictions  Continue pelvic rest  Follow-up in 3 to 4 weeks

## 2021-12-13 ENCOUNTER — TELEPHONE (OUTPATIENT)
Dept: OBSTETRICS AND GYNECOLOGY | Facility: CLINIC | Age: 36
End: 2021-12-13

## 2022-01-04 ENCOUNTER — OFFICE VISIT (OUTPATIENT)
Dept: OBSTETRICS AND GYNECOLOGY | Facility: CLINIC | Age: 37
End: 2022-01-04

## 2022-01-04 VITALS
SYSTOLIC BLOOD PRESSURE: 112 MMHG | BODY MASS INDEX: 24.75 KG/M2 | WEIGHT: 145 LBS | HEIGHT: 64 IN | DIASTOLIC BLOOD PRESSURE: 75 MMHG | HEART RATE: 64 BPM

## 2022-01-04 DIAGNOSIS — Z90.710 S/P LAPAROSCOPIC HYSTERECTOMY: ICD-10-CM

## 2022-01-04 DIAGNOSIS — Z48.89 POSTOPERATIVE VISIT: Primary | ICD-10-CM

## 2022-01-04 DIAGNOSIS — N87.1 DYSPLASIA OF CERVIX, HIGH GRADE CIN 2: ICD-10-CM

## 2022-01-04 DIAGNOSIS — F17.200 SMOKER: Chronic | ICD-10-CM

## 2022-01-04 PROCEDURE — 99024 POSTOP FOLLOW-UP VISIT: CPT | Performed by: OBSTETRICS & GYNECOLOGY

## 2022-01-04 RX ORDER — SUMATRIPTAN 50 MG/1
50 TABLET, FILM COATED ORAL DAILY PRN
COMMUNITY
Start: 2021-12-21

## 2022-01-04 RX ORDER — CETIRIZINE HYDROCHLORIDE, PSEUDOEPHEDRINE HYDROCHLORIDE 5; 120 MG/1; MG/1
1 TABLET, FILM COATED, EXTENDED RELEASE ORAL 2 TIMES DAILY PRN
COMMUNITY
Start: 2021-12-21

## 2022-01-04 NOTE — PROGRESS NOTES
"Post Operative Visit      CC: Post operative follow up    HPI:   Pain:  No  Vaginal bleeding:  No  Vaginal discharge:  No  Fever/chills:  No  Good appetite:  Yes  Normal bladder function:  Yes  Normal bowel function:  Yes  Hot flashes: No    Operative report, surgical findings and any pathology reviewed.    /75   Pulse 64   Ht 162.6 cm (64\")   Wt 65.8 kg (145 lb)   LMP  (LMP Unknown)   BMI 24.89 kg/m²     Physical Exam  Vitals and nursing note reviewed. Exam conducted with a chaperone present.   Constitutional:       Appearance: Normal appearance.   Neck:      Thyroid: No thyroid mass or thyromegaly.   Cardiovascular:      Rate and Rhythm: Regular rhythm.      Heart sounds: No murmur heard.      Pulmonary:      Effort: Pulmonary effort is normal.      Breath sounds: No wheezing.   Abdominal:      General: Abdomen is flat. Bowel sounds are normal. There is no distension.      Palpations: Abdomen is soft. There is no mass.      Tenderness: There is no abdominal tenderness. There is no guarding or rebound.      Hernia: No hernia is present.      Comments: The incisions are clean, dry, intact and well-healed.   Genitourinary:     General: Normal vulva.      Exam position: Lithotomy position.      Labia:         Right: No rash, tenderness, lesion or injury.         Left: No rash, tenderness, lesion or injury.       Vagina: Normal. No vaginal discharge, erythema, tenderness or bleeding.      Comments: The vaginal cuff is intact and healing well.  There is no erythema or abnormal discharge.  Skin:     General: Skin is warm and dry.   Neurological:      Mental Status: She is alert and oriented to person, place, and time.   Psychiatric:         Mood and Affect: Mood normal.         Behavior: Behavior normal.         Thought Content: Thought content normal.           ASSESSMENT:  Post-operative exam    Diagnoses and all orders for this visit:    1. Postoperative visit (Primary)  Assessment & Plan:  Stable " postoperative course.  Continue pelvic rest for 2 more weeks but may resume all other activities as normal.  No work restrictions  Continue Motrin for any pain postoperatively      2. S/P laparoscopic hysterectomy    3. Dysplasia of cervix, high grade TAMIKA 2  Assessment & Plan:  The patient will need to continue with yearly Pap smears.      4. Smoker  Assessment & Plan:  Smoking cessation          PLAN:    Any available photos and/or pathology were reviewed.  All questions answered.     Ok to resume normal activities  Abstinence until 8 weeks postop   Return to school/work without limitations    Counseling: Tobacco cessation reviewed.    Follow Up:  No follow-ups on file.            Jeferson Stark MD  01/04/2022

## 2022-01-05 PROBLEM — Z98.890 STATUS POST ENDOMETRIAL ABLATION: Status: RESOLVED | Noted: 2021-09-14 | Resolved: 2022-01-05

## 2022-01-05 NOTE — ASSESSMENT & PLAN NOTE
Stable postoperative course.  Continue pelvic rest for 2 more weeks but may resume all other activities as normal.  No work restrictions  Continue Motrin for any pain postoperatively

## 2022-04-25 ENCOUNTER — TELEPHONE (OUTPATIENT)
Dept: ORTHOPEDIC SURGERY | Facility: CLINIC | Age: 37
End: 2022-04-25

## 2022-04-25 ENCOUNTER — OFFICE VISIT (OUTPATIENT)
Dept: ORTHOPEDIC SURGERY | Facility: CLINIC | Age: 37
End: 2022-04-25

## 2022-04-25 VITALS — HEART RATE: 59 BPM | OXYGEN SATURATION: 97 % | WEIGHT: 137 LBS | BODY MASS INDEX: 23.39 KG/M2 | HEIGHT: 64 IN

## 2022-04-25 DIAGNOSIS — M65.4 DE QUERVAIN'S DISEASE (TENOSYNOVITIS): Primary | ICD-10-CM

## 2022-04-25 PROCEDURE — 99204 OFFICE O/P NEW MOD 45 MIN: CPT | Performed by: STUDENT IN AN ORGANIZED HEALTH CARE EDUCATION/TRAINING PROGRAM

## 2022-04-25 RX ORDER — METHYLPREDNISOLONE 4 MG/1
1 TABLET ORAL DAILY
Qty: 21 TABLET | Refills: 0 | Status: SHIPPED | OUTPATIENT
Start: 2022-04-25 | End: 2022-12-19

## 2022-04-25 NOTE — PROGRESS NOTES
"Chief Complaint  Pain of the Right Hand and Pain of the Right Wrist    Subjective          Mary Patel presents to [facility] for   History of Present Illness    The patient presents here today for evaluation of the right hand/wrist. She reports pain for about 6-7 months. She reports she started off with swelling. She has no known injury or trauma. She reports pain with lifting. She has no other complaints. She is right hand dominate. She works at a warehouse. She has tried bracing, ibuprofen and tylenol. She reports popping and catching feelings.   Allergies   Allergen Reactions   • Morphine Anaphylaxis        Social History     Socioeconomic History   • Marital status:      Spouse name: Murphy   • Number of children: 3   Tobacco Use   • Smoking status: Current Every Day Smoker     Packs/day: 0.50     Types: Cigarettes   • Smokeless tobacco: Never Used   • Tobacco comment: SMOKED LAST PM   Vaping Use   • Vaping Use: Never used   Substance and Sexual Activity   • Alcohol use: Never   • Drug use: Never   • Sexual activity: Yes     Partners: Male        I reviewed the patient's chief complaint, history of present illness, review of systems, past medical history, surgical history, family history, social history, medications, and allergy list.     REVIEW OF SYSTEMS    Constitutional: Denies fevers, chills, weight loss  Cardiovascular: Denies chest pain, shortness of breath  Skin: Denies rashes, acute skin changes  Neurologic: Denies headache, loss of consciousness  MSK: Right wrist pain      Objective   Vital Signs:   Pulse 59   Ht 162.6 cm (64\")   Wt 62.1 kg (137 lb)   SpO2 97%   BMI 23.52 kg/m²     Body mass index is 23.52 kg/m².    Physical Exam    General: Alert. No acute distress.   Tender to first dorsal compartment tendons and radial styloid. Positive Finkelstein test. Neurovascularly intact. Sensation to light touch median, radial, ulnar nerve. Positive AIN, PIN, " ulnar nerve. Positive pulses. 60 extension. 60 flexion. No pain with gentle wrist ROM. Neurovascularly intact. Non-tender to the snuff box and DRUJ. Non-tender to hand or fingers. No tender to the elbow. Forearm soft.     Procedures    Imaging Results (Most Recent)     None                   Assessment and Plan    Diagnoses and all orders for this visit:    1. De Quervain's disease (tenosynovitis) (Primary)      Discussed the treatment plan with the patient.  We discussed conservative treatment with the patient. Prescription for Voltaren gel given today. Discussed the risks and benefits of an injection. The patient expressed understanding and declined. Prescription for a medrol dose pack. Plan to continue bracing at all times. Work note given today with restrictions.     Call or return if symptoms worsen or patient has any concerns.       Scribed for Odin Pete MD by Delilah Finn  04/25/2022   08:38 EDT         Follow Up   Return in about 4 weeks (around 5/23/2022).  Patient was given instructions and counseling regarding her condition or for health maintenance advice. Please see specific information pulled into the AVS if appropriate.       I have personally performed the services described in this document as scribed by the above individual and it is both accurate and complete.     Odin Pete MD  04/25/22  08:44 EDT

## 2022-04-25 NOTE — TELEPHONE ENCOUNTER
Provider: DR. OLMSTEAD    Caller: PATIENT     Relationship to Patient: SELF     Pharmacy: [facility]- [phone]    Phone Number: [phone]    Reason for Call:  PT. WAS SEEN THIS MORNING.   STATES THAT DR. OLMSTEAD WAS GOING TO SEND IN PRECRIPTIONS FOR A CREAM AND A STEROID.   HER PHARMACY TOLD HER THEY HAVE NOT RECEIVED ANYTHING YET.   PLEASE ADVISE.     When was the patient last seen: THIS MORNING

## 2022-05-06 ENCOUNTER — TELEPHONE (OUTPATIENT)
Dept: ORTHOPEDIC SURGERY | Facility: CLINIC | Age: 37
End: 2022-05-06

## 2022-05-06 NOTE — TELEPHONE ENCOUNTER
Patient states she needs a new work note stating she can assist with job duties with her right hand, lifting no more than 5 pounds.

## 2022-05-25 ENCOUNTER — OFFICE VISIT (OUTPATIENT)
Dept: ORTHOPEDIC SURGERY | Facility: CLINIC | Age: 37
End: 2022-05-25

## 2022-05-25 VITALS — HEART RATE: 61 BPM | HEIGHT: 64 IN | WEIGHT: 137 LBS | BODY MASS INDEX: 23.39 KG/M2 | OXYGEN SATURATION: 99 %

## 2022-05-25 DIAGNOSIS — M65.4 DE QUERVAIN'S DISEASE (TENOSYNOVITIS): Primary | ICD-10-CM

## 2022-05-25 PROCEDURE — 99213 OFFICE O/P EST LOW 20 MIN: CPT | Performed by: PHYSICIAN ASSISTANT

## 2022-05-28 NOTE — PROGRESS NOTES
"Chief Complaint  Follow-up of the Right Wrist    Subjective          Mary Patel presents to [facility] for   History of Present Illness    Mary presents today for a follow up of her right wrist. Patient has right de quervains tenosynovisitis. She has been wearing her wrist brace without complications. She reports mild improvements in her pain. She explains that she started a new position at work and is currently in training so she has to take a lot of notes. When writing, she has to remove her brace. She states her pain is improved with the brace so she wears it as much as she can. She reports not taking the medrol dose pack due to it causing fatigue. She continues with voltaren gel. Denies swelling. Denies numbness or tingling. Denies new injuries.       Allergies   Allergen Reactions   • Morphine Anaphylaxis        Social History     Socioeconomic History   • Marital status:      Spouse name: Murphy   • Number of children: 3   Tobacco Use   • Smoking status: Current Every Day Smoker     Packs/day: 0.50     Types: Cigarettes   • Smokeless tobacco: Never Used   • Tobacco comment: SMOKED LAST PM   Vaping Use   • Vaping Use: Never used   Substance and Sexual Activity   • Alcohol use: Never   • Drug use: Never   • Sexual activity: Yes     Partners: Male        I reviewed the patient's chief complaint, history of present illness, review of systems, past medical history, surgical history, family history, social history, medications, and allergy list.     REVIEW OF SYSTEMS    Constitutional: Denies fevers, chills, weight loss  Cardiovascular: Denies chest pain, shortness of breath  Skin: Denies rashes, acute skin changes  Neurologic: Denies headache, loss of consciousness  MSK: Right wrist pain      Objective   Vital Signs:   Pulse 61   Ht 162.6 cm (64\")   Wt 62.1 kg (137 lb)   SpO2 99%   BMI 23.52 kg/m²     Body mass index is 23.52 kg/m².    Physical Exam    General: " Alert. No acute distress.   Right upper extremity: Tenderness to palpation about the first dorsal compartment tendons and radial styloid. No other areas of tenderness. Stiffness with finger flexion and extension. Positive finkelstein test. Active wrist range of motion. Thumb opposition intact. Palmar abduction of the thumb intact. Sensation intact to the median, radial, and ulnar nerve distributions. Palpable radial pulse.     Procedures    Imaging Results (Most Recent)     None                 Assessment and Plan    Diagnoses and all orders for this visit:    1. De Quervain's disease (tenosynovitis) (Primary)  -     Diclofenac Sodium (VOLTAREN) 1 % gel gel; Apply 4 g topically to the appropriate area as directed 4 (Four) Times a Day.  Dispense: 200 g; Refill: 1        Mary presents today for a follow up of her right de quervain tenosynovitis. We discussed continuing with conservative management. Patient will continue with wrist brace at all times. We discussed a steroid injection today. Patient understood and declined. Voltaren gel was refilled today. Continue with voltaren gel, ice, and elevation as needed. Continue with work restriction. Patient will follow up in 4 weeks for reevaluation. No new x-rays needed at next visit.     Call or return if symptoms worsen or patient has any concerns.       Follow Up   Return in about 4 weeks (around 6/22/2022).  Patient was given instructions and counseling regarding her condition or for health maintenance advice. Please see specific information pulled into the AVS if appropriate.     Kristan Cisneros PA-C  05/28/22  12:21 EDT

## 2022-10-05 NOTE — ASSESSMENT & PLAN NOTE
REPORT OF CONSULTATION  Patient Name: Dae Castañeda   MRN: [id number]   : 1975   Admission Date/Time: 10/5/2022  5:24 AM   Primary Care Physician: Nataliya Quiroz MD   Consulting Physician: Agustina Andrade MD    REASON FOR CONSULTATION  I am asked to see this patient in consultation at the request of Shine Collazo,* for evaluation of tremors.     HPI: This is a 47 year old female with Hx of non-epileptic events, depression, infertility who was consulted due to concerns for shaking of the extremities.   Patient reported that for the last 20 years she has been having intermittent tremoring of her extremities.   Patient reported that initial event occurred when she was depressed and was on paxil and her dose was increased. She developed tremors and has been having them since that time   They wary in intensity and at times she would have sense of internal vibration, other times would escalate to having violent movements of her extremities and head. Patient reported some strain type injuries. No falls as she gets warning of spasms in her right quad muscle and is able to sit down. Patient noted that vibration, exercises (or even just prolonged normal activity like walking and climbing stairs) and cold aggravate her ssx. Distracting herself helps to some degree, if patient is focusing on her ssx they get worse. No apparent psychological events that patient is able to recall in connection to the events.   Patient denied palpitations, does have involvement of spasms of her trunk muscles.   Patient is fully conscious with events and able to talk. She is not able voluntarily stop the events. Patient tried multiple home remedies including hot packs without effect. She lately suffers through events until they resolve which may take hours.     I reviewed notes by Dr Radford from patient's prior hospitalization. EEG monitoring was done and no epileptiform changes were seen with the events.  Stable post operative course. AUB is improved       PAST MEDICAL HISTORY  Past Medical History:   Diagnosis Date     Abnormal involuntary movement 2017    Author: Shaheed Freeman MD Service: Hospitalist Author Type: Physician    Filed: 2015  8:10 PM Note Time: 2015 10:35 AM Status: Signed   : Shaheed Freeman MD (Physician)     Expand All Collapse All    DATE OF ADMISSION:  2015      DATE OF DISCHARGE:  2015      PRIMARY CARE PHYSICIAN:  Nancy Brady MD      CHIEF COMPLAINT:  Involuntary jerky movements.         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 Orders    EEG        Expand All Collapse All    ONE-HOUR VIDEO ELECTROENCEPHALOGRAM       ORDERING PHYSICIAN:  Lito Jasmine MD       EEG #:        This EEG was done on [facility] with video monitoring recording of the spell.  EEG demonstrated some sharp wave activity bilaterally that is not rhythmic.  The patient otherwise had a spell that started with tingling and d     H/O magnetic resonance imaging of brain and brain stem 3/6/2017    2017 normal brain mri     Major depressive disorder, single episode, mild (H)     chronic     Myalgia and myositis, unspecified     episodic low back and hip pain     Ruptured ovarian cyst 2011    right, requiring laparoscopy for drainage intra-abdominal hematoma     Serotonin syndrome     SSRI related (paroxetine and venlaxine), also triggered by anesthetic agents in      Supervision of other normal pregnancy      - vaginal, pergonal for infertility        PAST SURGICAL HISTORY  Past Surgical History:   Procedure Laterality Date     ASPIRATION AND CURETTAGE  3/2008    Missed AB following in vitro fertilzation/implantation     DAVINCI HYSTERECTOMY TOTAL, BILATERAL SALPINGO-OOPHORECTOMY,  COMBINED N/A 10/5/2022    Procedure: ROBOTIC ASSISTED TOTAL HYSTERECTOMY, LEFT SALPINGO-OOPHORECTOMY, RIGHT SALPINGECTOMY,LYSIS OF ADHESIONS;  Surgeon: Shine Collazo MD;  Location: [facility]     GYN SURGERY      ruptured ovarian cyst     [facility] REPAIR OF NASAL SEPTUM  4/2000     LAPAROSCOPIC APPENDECTOMY N/A 8/23/2021    Procedure: LAPAROSCOPIC APPENDECTOMY;  Surgeon: Nazario Contreras MD;  Location:  OR     LAPAROSCOPY  1/2011    for drainage intra-abdominal hematoma s/p ruptured right ovarian cyst        ALLERGIES/SENSITIVITIES  Allergies   Allergen Reactions     No Clinical Screening - See Comments      Other reaction(s): Other, see comments  PN: Don't give medications that increase serotonin level, will end up in ICU.      Codeine GI Disturbance     Codeine      Other reaction(s): Other, see comments  PN:  Nausea     Gabapentin      Serotonin syndrome     Latex      Added based on information entered during case entry, please review and add reactions, type, and severity as needed     Paroxetine      serotonin syndrome, all seratonin drugs     Paroxetine      PN: ho serotonin syndrome per pet     Venlafaxine      serotonin syndrome         CURRENT MEDS  Current Outpatient Medications   Medication Sig Dispense Refill     acetaminophen (TYLENOL) 325 MG tablet Take 2 tablets (650 mg) by mouth every 4 hours as needed for other (mild pain) 100 tablet 0     hydrOXYzine (ATARAX) 25 MG tablet Take 1 tablet (25 mg) by mouth every 6 hours as needed for itching or anxiety (with pain, moderate pain) 30 tablet 0     ondansetron (ZOFRAN ODT) 4 MG ODT tab Take 1 tablet (4 mg) by mouth every 8 hours as needed for nausea 4 tablet 0     ondansetron (ZOFRAN ODT) 4 MG ODT tab Take 1-2 tablets (4-8 mg) by mouth every 8 hours as needed for nausea Dissolve ON the tongue. 10 tablet 0     senna-docusate (SENOKOT-S/PERICOLACE) 8.6-50 MG tablet Take 1-2 tablets by mouth 2 times daily 30 tablet 0     senna-docusate  (SENOKOT-S/PERICOLACE) 8.6-50 MG tablet Take 1-2 tablets by mouth 2 times daily Take while on oral narcotics to prevent or treat constipation. 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     Occupation: homemaker   Tobacco Use     Smoking status: Never Smoker     Smokeless tobacco: Never Used   Substance and Sexual Activity     Alcohol use: Yes     Drug use: No     Sexual activity: Yes     Partners: Male     Comment: same since partner since    Other Topics Concern      Service No     Blood Transfusions No     Caffeine Concern No     Occupational Exposure No     Hobby Hazards No     Sleep Concern No     Comment: with zolpidem     Stress Concern No     Weight Concern No     Special Diet No     Back Care No     Exercise Yes     Comment: stretching/aerobic 20 min 3 days per week     Bike Helmet Yes     Seat Belt Yes     Self-Exams Yes     Parent/sibling w/ CABG, MI or angioplasty before 65F 55M? Not Asked   Social History Narrative    , 2 children, works as  at [employer]  (last updated 2022)      Social Determinants of Health     Financial Resource Strain: Not on file   Food Insecurity: Not on file   Transportation Needs: Not on file   Physical Activity: Not on file   Stress: Not on file   Social Connections: Not on file   Intimate Partner Violence: Not on file   Housing Stability: Not on file       FAMILY HISTORY  Family History   Problem Relation Age of Onset     Lipids Mother      Thyroid Disease Mother         hypothyroidism     Breast Cancer Mother 61     Depression Father      Hypertension Father      C.A.D. Father          MI age 64  "    Diabetes Maternal Grandmother         type 2     Diabetes Maternal Grandfather         type 2     C.A.D. Maternal Grandfather         multiple MIs, onset age 50s     Tremor Maternal Grandfather      Prostate Cancer Paternal Grandfather          age 70s     Medical History Unknown Daughter      Glaucoma No family hx of      Macular Degeneration No family hx of         REVIEW OF SYSTEMS: Systems (general, cardiovascular, respiratory, ENT/eyes, GI, , musculo-skeletal, neuro, psychiatric, allergology-immunologic and endocrine) were reviewed with pertinent positives noted in HPI and otherwise all others were negative.     EXAM: /65   Pulse 92   Temp 98.1  F (36.7  C) (Oral)   Resp 14   Ht 1.676 m (5' 6\")   Wt 54.7 kg (120 lb 9.6 oz)   LMP 2022   SpO2 99%   BMI 19.47 kg/m     General Appearance: no acute distress  HEENT: NC/AT  Neck: full passive ROM; no carotid bruits  Chest: CTAB; normal respiratory effort  Cardio: RRR; no murmurs; normal peripheral pulses  Abd: NT/ND  Mental status: Alert and oriented to person, place and time. Speech clear and language are within normal limits.  Cranial nerves: Pupils equal, round, and reactive to light. Extraocular movements intact. Visual fields full to confrontation. Facial strength normal and sensation intact bilaterally. Palate elevation symmetric. Hearing intact. Tongue protrusion midline. Shoulder shrug symmetric.  Motor: Muscle tone and bulk are normal.  Strength within normal limits in all extremities. Patient would have component of give-way with her testing of arms  I witnessed several episodes of clonus-like movements of her lower extremities triggered by strength and reflex testing.   Sensory: Light touch, pin prick sensation are normal.  Coordination: Finger-to-nose, heel-to-shin are performed normally.   Reflexes: Symmetric, caused exacerbated startle responses and otherwise within normal limits. Flexor plantar responses bilaterally. " Negative ying's bilaterally.   Gait: Not tested    IMAGING: I have personally reviewed the patient's imaging:     MRI brain 2015:  IMPRESSION:  Normal MRI of the brain.      LABS:   Component      Latest Ref Rng & Units 10/5/2022   WBC      4.0 - 11.0 10e3/uL 12.3 (H)   RBC Count      3.80 - 5.20 10e6/uL 4.36   Hemoglobin      11.7 - 15.7 g/dL 13.2   Hematocrit      35.0 - 47.0 % 38.3   MCV      78 - 100 fL 88   MCH      26.5 - 33.0 pg 30.3   MCHC      31.5 - 36.5 g/dL 34.5   RDW      10.0 - 15.0 % 11.4   Platelet Count      150 - 450 10e3/uL 243   % Neutrophils      % 93   % Lymphocytes      % 4   % Monocytes      % 2   % Eosinophils      % 0   % Basophils      % 0   % Immature Granulocytes      % 1   NRBCs per 100 WBC      <1 /100 0   Absolute Neutrophils      1.6 - 8.3 10e3/uL 11.5 (H)   Absolute Lymphocytes      0.8 - 5.3 10e3/uL 0.5 (L)   Absolute Monocytes      0.0 - 1.3 10e3/uL 0.3   Absolute Eosinophils      0.0 - 0.7 10e3/uL 0.0   Absolute Basophils      0.0 - 0.2 10e3/uL 0.0   Absolute Immature Granulocytes      <=0.4 10e3/uL 0.1   Absolute NRBCs      10e3/uL 0.0   Sodium      133 - 144 mmol/L 135   Potassium      3.4 - 5.3 mmol/L 3.7   Chloride      94 - 109 mmol/L 104   Carbon Dioxide      20 - 32 mmol/L 26   Anion Gap      3 - 14 mmol/L 5   Urea Nitrogen      7 - 30 mg/dL 10   Creatinine      0.52 - 1.04 mg/dL 0.67   Calcium      8.5 - 10.1 mg/dL 8.7   Glucose      70 - 99 mg/dL 163 (H)   GFR Estimate      >60 mL/min/1.73m2 >90   Magnesium      1.6 - 2.3 mg/dL 2.0   Phosphorus      2.5 - 4.5 mg/dL 2.2 (L)         CONSULTATION IMPRESSION/RECOMMENDATIONS:   Active Problems:    * No active hospital problems. *     This is a 47 year old woman who presents with recurrent episodes of tremulousness which varies in degree. Not epileptiform per prior testing. Exam today normal except exadurated responses with reflex testing and prior MRI brain done for the same concern was normal. No apparent inciting event  except paxil exposure.   Discussed that likely hyperexcitability of her nerves/muscles. Some anxiety component as patient is better when distracts herself. Likely type of conversion disorder.   -discussed that I don't see signs of concerning conditions of the brain/spine and do not recommend additional testing  -dicussed with patient empiric trials of anti-anxiety and muscle relaxants given that she significantly disturbed with her ssx. Will try magnesium and see if patient responds. Discussed that I recommend follow up in the clinic as it might take time to get better. Would like to see patient in 4-5 weeks.   Discussed options of robaxin, possible TCA.       Will sign off, please page with questions: pager [phone]  I thank Dr. Shine Collazo for the opportunity to participate in the patient's care.     Total consult time 110 minutes with the time spent on chart review, imaging and lab results review, and more than 50 % of the time spent on coordination of cares and face-to-face time with the patient including counseling regarding pathophysiology of the above conditions, results of the tests and further directions of care.     Agustina Andrade MD  [facility]

## 2022-12-15 NOTE — PROGRESS NOTES
Chief Complaint   Rectal bleed, Nausea vomiting when eating     History of Present Illness       Mary Patel is a 37 y.o. female who presents to [facility] for follow-up with a personal history of colon polyps, rectal bleeding, epigastric pain, nausea and vomiting.  Patient reports over the past 6 months she has developed epigastric pain with nausea and vomiting almost daily.  She reports when she sees food she experiences nausea and every time she eats she will vomit or have dry heaving.  Patient reports her bowel movements are every 2 to 3 days and firm and difficult to pass and reported as stringy and small in caliber.  She reports bright red rectal bleeding intermittently with bowel movements.  She does have a personal history of colon polyps and a family history of colon cancer in her maternal grandfather at age 65.  She reports weight loss of 15 pounds over the past 6 months.  She is hydrating well.  She is currently taking Nexium 20 mg daily with minimal relief.  Patient reports minimal use of NSAIDs.  Patient denies fever, night sweats, melena, hematemesis.    Colonoscopy: Review of the patient's most recent colonoscopy performed by Dr. Singh on 2020 sessile 6 mm polyp in the cecum, 6 mm polyp in the transverse colon, 2 polyps in the rectum and a 12 mm polyp in the rectum grade 1 internal hemorrhoids.  Repeat colonoscopy .  Tubular adenoma pathology.    Most recent labs on 2022 normal hemoglobin.    Results       Result Review :                             Past Medical History       Past Medical History:   Diagnosis Date   • Colon polyps    • GERD (gastroesophageal reflux disease)    • Migraine    • Pelvic pain        Past Surgical History:   Procedure Laterality Date   •  SECTION     • CHOLECYSTECTOMY     • COLONOSCOPY     • D & C HYSTEROSCOPY ENDOMETRIAL ABLATION N/A 2021    Procedure: DILATATION AND CURETTAGE HYSTEROSCOPY NOVASURE  ENDOMETRIAL ABLATION;  Surgeon: Jeferson Stark MD;  Location: [facility] MAIN OR;  Service: Gynecology;  Laterality: N/A;   • ENDOSCOPY     • SALPINGECTOMY Bilateral 8/26/2021    Procedure: LYSIS OF ADHESIONS, SALPINGECTOMY LAPAROSCOPIC;  Surgeon: Jeferson Stark MD;  Location: [facility] MAIN OR;  Service: Gynecology;  Laterality: Bilateral;   • TOTAL LAPAROSCOPIC HYSTERECTOMY N/A 11/18/2021    Procedure: TOTAL LAPAROSCOPIC HYSTERECTOMY; LYSIS OF ADHESIONS; CYSTOSCOPY;  Surgeon: Jeferson Stark MD;  Location: [facility] MAIN OR;  Service: Gynecology;  Laterality: N/A;   • TUBAL ABDOMINAL LIGATION           Current Outpatient Medications:   •  cetirizine-pseudoephedrine (ZyrTEC-D) 5-120 MG per 12 hr tablet, Take 1 tablet by mouth 2 (Two) Times a Day As Needed., Disp: , Rfl:   •  Diclofenac Sodium (VOLTAREN) 1 % gel gel, Apply 4 g topically to the appropriate area as directed 4 (Four) Times a Day., Disp: 200 g, Rfl: 1  •  ibuprofen (ADVIL,MOTRIN) 600 MG tablet, Take 1 tablet by mouth Every 6 (Six) Hours As Needed for Mild Pain ., Disp: 60 tablet, Rfl: 1  •  SUMAtriptan (IMITREX) 50 MG tablet, Take 50 mg by mouth Daily As Needed., Disp: , Rfl:   •  esomeprazole (nexIUM) 40 MG capsule, Take 1 capsule by mouth Daily., Disp: 30 capsule, Rfl: 2  •  hyoscyamine (ANASPAZ,LEVSIN) 0.125 MG tablet, Take 1 tablet by mouth Every 4 (Four) Hours As Needed for Cramping., Disp: 90 tablet, Rfl: 2  •  ondansetron (Zofran) 4 MG tablet, Take 1 tablet by mouth Every 8 (Eight) Hours As Needed for Nausea or Vomiting., Disp: 30 tablet, Rfl: 1  •  Sodium Sulfate-Mag Sulfate-KCl (Sutab) 1756-074-809 MG tablet, Take 12 tablets by mouth Take As Directed., Disp: 24 tablet, Rfl: 0     Allergies   Allergen Reactions   • Morphine Anaphylaxis       Family History   Problem Relation Age of Onset   • Breast cancer Paternal Grandmother    • Colon cancer Maternal Grandfather    • Malig Hyperthermia Neg Hx         Social History     Social  "History Narrative   • Not on file       Objective     Vital Signs:   /68 (BP Location: Right arm, Patient Position: Sitting, Cuff Size: Adult)   Pulse 76   Ht 162.6 cm (64\")   Wt 62.6 kg (138 lb)   SpO2 100%   BMI 23.69 kg/m²       Physical Exam  Constitutional:       General: She is not in acute distress.     Appearance: Normal appearance. She is well-developed and normal weight.   Eyes:      Conjunctiva/sclera: Conjunctivae normal.      Pupils: Pupils are equal, round, and reactive to light.      Visual Fields: Right eye visual fields normal and left eye visual fields normal.   Cardiovascular:      Rate and Rhythm: Normal rate and regular rhythm.      Heart sounds: Normal heart sounds.   Pulmonary:      Effort: Pulmonary effort is normal. No retractions.      Breath sounds: Normal breath sounds and air entry.      Comments: Inspection of chest: normal appearance  Abdominal:      General: Bowel sounds are normal.      Palpations: Abdomen is soft.      Tenderness: There is abdominal tenderness in the right upper quadrant, epigastric area and left upper quadrant.      Comments: No appreciable hepatosplenomegaly   Musculoskeletal:      Cervical back: Neck supple.      Right lower leg: No edema.      Left lower leg: No edema.   Lymphadenopathy:      Cervical: No cervical adenopathy.   Skin:     Findings: No lesion.      Comments: Turgor normal   Neurological:      Mental Status: She is alert and oriented to person, place, and time.   Psychiatric:         Mood and Affect: Mood and affect normal.           Assessment & Plan          Assessment and Plan    Diagnoses and all orders for this visit:    1. Nausea (Primary)    2. Nausea and vomiting, unspecified vomiting type    3. Personal history of colonic polyps    4. Rectal bleeding    5. Epigastric pain    6. Altered bowel habits    Other orders  -     esomeprazole (nexIUM) 40 MG capsule; Take 1 capsule by mouth Daily.  Dispense: 30 capsule; Refill: 2  -     " hyoscyamine (ANASPAZ,LEVSIN) 0.125 MG tablet; Take 1 tablet by mouth Every 4 (Four) Hours As Needed for Cramping.  Dispense: 90 tablet; Refill: 2  -     Sodium Sulfate-Mag Sulfate-KCl (Sutab) 0441-213-361 MG tablet; Take 12 tablets by mouth Take As Directed.  Dispense: 24 tablet; Refill: 0  -     ondansetron (Zofran) 4 MG tablet; Take 1 tablet by mouth Every 8 (Eight) Hours As Needed for Nausea or Vomiting.  Dispense: 30 tablet; Refill: 1      37-year-old female presenting the office today with a history of nausea, vomiting, personal history of colon polyps, rectal bleeding, epigastric pain and altered bowel habits.  I have recommended that the patient undergo further evaluation with an EGD and colonoscopy.  I have discussed this procedure in detail with the patient.  I have discussed the risks, benefits and alternatives.  I have discussed the risk of anesthesia, bleeding and perforation.  Patient understands these risks, benefits and alternatives and wishes to proceed.  I will schedule her at her earliest convenience.  I have provided Nexium at an increased dose to 40 mg daily for the patient's nausea and epigastric pain to see if we can relieve reflux symptoms.  I have provided Levsin to be taken as needed for abdominal cramps.  I have prescribed Zofran to be used as needed for nausea and vomiting.  Patient will follow-up in the office after endoscopy.  Patient agreeable to this plan will call with any questions or concerns.            Follow Up       Follow Up   Return for Follow up after endoscopy in office.  Patient was given instructions and counseling regarding her condition or for health maintenance advice. Please see specific information pulled into the AVS if appropriate.

## 2022-12-19 ENCOUNTER — PREP FOR SURGERY (OUTPATIENT)
Dept: OTHER | Facility: HOSPITAL | Age: 37
End: 2022-12-19

## 2022-12-19 ENCOUNTER — OFFICE VISIT (OUTPATIENT)
Dept: GASTROENTEROLOGY | Facility: CLINIC | Age: 37
End: 2022-12-19

## 2022-12-19 VITALS
DIASTOLIC BLOOD PRESSURE: 68 MMHG | HEIGHT: 64 IN | SYSTOLIC BLOOD PRESSURE: 114 MMHG | BODY MASS INDEX: 23.56 KG/M2 | HEART RATE: 76 BPM | OXYGEN SATURATION: 100 % | WEIGHT: 138 LBS

## 2022-12-19 DIAGNOSIS — G89.29 CHRONIC PELVIC PAIN IN FEMALE: Primary | ICD-10-CM

## 2022-12-19 DIAGNOSIS — Z86.010 PERSONAL HISTORY OF COLONIC POLYPS: ICD-10-CM

## 2022-12-19 DIAGNOSIS — R19.4 ALTERED BOWEL HABITS: ICD-10-CM

## 2022-12-19 DIAGNOSIS — R11.0 NAUSEA: ICD-10-CM

## 2022-12-19 DIAGNOSIS — R11.2 NAUSEA AND VOMITING, UNSPECIFIED VOMITING TYPE: ICD-10-CM

## 2022-12-19 DIAGNOSIS — R10.13 EPIGASTRIC PAIN: ICD-10-CM

## 2022-12-19 DIAGNOSIS — R11.0 NAUSEA: Primary | ICD-10-CM

## 2022-12-19 DIAGNOSIS — K62.5 RECTAL BLEEDING: ICD-10-CM

## 2022-12-19 DIAGNOSIS — R10.2 CHRONIC PELVIC PAIN IN FEMALE: Primary | ICD-10-CM

## 2022-12-19 PROCEDURE — 99214 OFFICE O/P EST MOD 30 MIN: CPT | Performed by: NURSE PRACTITIONER

## 2022-12-19 RX ORDER — ESOMEPRAZOLE MAGNESIUM 40 MG/1
40 CAPSULE, DELAYED RELEASE ORAL DAILY
Qty: 30 CAPSULE | Refills: 2 | Status: SHIPPED | OUTPATIENT
Start: 2022-12-19

## 2022-12-19 RX ORDER — ONDANSETRON 4 MG/1
4 TABLET, FILM COATED ORAL EVERY 8 HOURS PRN
Qty: 30 TABLET | Refills: 1 | Status: SHIPPED | OUTPATIENT
Start: 2022-12-19

## 2022-12-19 RX ORDER — HYOSCYAMINE SULFATE 0.125 MG
0.12 TABLET ORAL EVERY 4 HOURS PRN
Qty: 90 TABLET | Refills: 2 | Status: SHIPPED | OUTPATIENT
Start: 2022-12-19

## 2022-12-19 RX ORDER — SOD SULF/POT CHLORIDE/MAG SULF 1.479 G
12 TABLET ORAL TAKE AS DIRECTED
Qty: 24 TABLET | Refills: 0 | Status: ON HOLD | OUTPATIENT
Start: 2022-12-19 | End: 2023-02-03

## 2023-01-04 ENCOUNTER — TELEPHONE (OUTPATIENT)
Dept: GASTROENTEROLOGY | Facility: CLINIC | Age: 38
End: 2023-01-04
Payer: COMMERCIAL

## 2023-01-04 NOTE — TELEPHONE ENCOUNTER
I received an email that patient was attempted to contact 4 times, with no response by [email or web address]. I spoke with pt. She states she never received a call. Patient would like to pursue Sutab, vs sending in a new rx to a local pharmacy. I provided patient with [employer]'s number of [phone]. She states she would call.

## 2023-01-20 ENCOUNTER — TELEPHONE (OUTPATIENT)
Dept: GASTROENTEROLOGY | Facility: CLINIC | Age: 38
End: 2023-01-20
Payer: COMMERCIAL

## 2023-01-20 NOTE — TELEPHONE ENCOUNTER
I spoke with Ms Patel, confirmed her scheduled egd/colonoscopy on 02.03.2023, estimated arrival time of 12:00pm. Reminded of liquid diet the day prior. Reminded of bowel prep and instructions.  Voiced understanding. miguel a

## 2023-02-03 ENCOUNTER — ANESTHESIA EVENT (OUTPATIENT)
Dept: GASTROENTEROLOGY | Facility: HOSPITAL | Age: 38
End: 2023-02-03
Payer: COMMERCIAL

## 2023-02-03 ENCOUNTER — ANESTHESIA (OUTPATIENT)
Dept: GASTROENTEROLOGY | Facility: HOSPITAL | Age: 38
End: 2023-02-03
Payer: COMMERCIAL

## 2023-02-03 ENCOUNTER — HOSPITAL ENCOUNTER (OUTPATIENT)
Facility: HOSPITAL | Age: 38
Setting detail: HOSPITAL OUTPATIENT SURGERY
Discharge: HOME | End: 2023-02-03
Attending: INTERNAL MEDICINE | Admitting: INTERNAL MEDICINE
Payer: COMMERCIAL

## 2023-02-03 VITALS
SYSTOLIC BLOOD PRESSURE: 108 MMHG | BODY MASS INDEX: 23.23 KG/M2 | HEART RATE: 49 BPM | WEIGHT: 135.36 LBS | DIASTOLIC BLOOD PRESSURE: 69 MMHG | RESPIRATION RATE: 15 BRPM | TEMPERATURE: 97 F | OXYGEN SATURATION: 99 %

## 2023-02-03 DIAGNOSIS — R19.4 ALTERED BOWEL HABITS: ICD-10-CM

## 2023-02-03 DIAGNOSIS — Z86.010 PERSONAL HISTORY OF COLONIC POLYPS: ICD-10-CM

## 2023-02-03 DIAGNOSIS — R11.0 NAUSEA: ICD-10-CM

## 2023-02-03 DIAGNOSIS — R10.13 EPIGASTRIC PAIN: ICD-10-CM

## 2023-02-03 DIAGNOSIS — R11.2 NAUSEA AND VOMITING, UNSPECIFIED VOMITING TYPE: ICD-10-CM

## 2023-02-03 DIAGNOSIS — K62.5 RECTAL BLEEDING: ICD-10-CM

## 2023-02-03 PROCEDURE — 43239 EGD BIOPSY SINGLE/MULTIPLE: CPT | Performed by: INTERNAL MEDICINE

## 2023-02-03 PROCEDURE — 88305 TISSUE EXAM BY PATHOLOGIST: CPT | Performed by: INTERNAL MEDICINE

## 2023-02-03 PROCEDURE — 45378 DIAGNOSTIC COLONOSCOPY: CPT | Performed by: INTERNAL MEDICINE

## 2023-02-03 PROCEDURE — 25010000002 PROPOFOL 10 MG/ML EMULSION: Performed by: NURSE ANESTHETIST, CERTIFIED REGISTERED

## 2023-02-03 RX ORDER — LIDOCAINE HYDROCHLORIDE 20 MG/ML
INJECTION, SOLUTION EPIDURAL; INFILTRATION; INTRACAUDAL; PERINEURAL AS NEEDED
Status: DISCONTINUED | OUTPATIENT
Start: 2023-02-03 | End: 2023-02-03 | Stop reason: SURG

## 2023-02-03 RX ORDER — SODIUM CHLORIDE, SODIUM LACTATE, POTASSIUM CHLORIDE, CALCIUM CHLORIDE 600; 310; 30; 20 MG/100ML; MG/100ML; MG/100ML; MG/100ML
30 INJECTION, SOLUTION INTRAVENOUS CONTINUOUS
Status: DISCONTINUED | OUTPATIENT
Start: 2023-02-03 | End: 2023-02-03 | Stop reason: HOSPADM

## 2023-02-03 RX ORDER — PROPOFOL 10 MG/ML
VIAL (ML) INTRAVENOUS AS NEEDED
Status: DISCONTINUED | OUTPATIENT
Start: 2023-02-03 | End: 2023-02-03 | Stop reason: SURG

## 2023-02-03 RX ORDER — LUBIPROSTONE 24 UG/1
24 CAPSULE ORAL 2 TIMES DAILY WITH MEALS
Qty: 60 CAPSULE | Refills: 5 | Status: SHIPPED | OUTPATIENT
Start: 2023-02-03

## 2023-02-03 RX ADMIN — PROPOFOL 100 MG: 10 INJECTION, EMULSION INTRAVENOUS at 15:27

## 2023-02-03 RX ADMIN — PROPOFOL 165 MCG/KG/MIN: 10 INJECTION, EMULSION INTRAVENOUS at 15:14

## 2023-02-03 RX ADMIN — PROPOFOL 100 MG: 10 INJECTION, EMULSION INTRAVENOUS at 15:14

## 2023-02-03 RX ADMIN — LIDOCAINE HYDROCHLORIDE 60 MG: 20 INJECTION, SOLUTION EPIDURAL; INFILTRATION; INTRACAUDAL; PERINEURAL at 15:14

## 2023-02-03 RX ADMIN — SODIUM CHLORIDE, POTASSIUM CHLORIDE, SODIUM LACTATE AND CALCIUM CHLORIDE: 600; 310; 30; 20 INJECTION, SOLUTION INTRAVENOUS at 15:13

## 2023-02-03 NOTE — ANESTHESIA PREPROCEDURE EVALUATION
Anesthesia Evaluation     Patient summary reviewed and Nursing notes reviewed   no history of anesthetic complications:  NPO Solid Status: > 8 hours  NPO Liquid Status: > 2 hours           Airway   Mallampati: II  TM distance: >3 FB  Neck ROM: full  No difficulty expected  Dental      Pulmonary - negative pulmonary ROS and normal exam    breath sounds clear to auscultation  Cardiovascular - negative cardio ROS and normal exam  Exercise tolerance: good (4-7 METS)    Rhythm: regular  Rate: normal        Neuro/Psych- negative ROS  GI/Hepatic/Renal/Endo    (+)  GERD, GI bleeding ,     Musculoskeletal (-) negative ROS    Abdominal    Substance History - negative use     OB/GYN negative ob/gyn ROS         Other - negative ROS       ROS/Med Hx Other: PAT Nursing Notes unavailable.                   Anesthesia Plan    ASA 2     general       Anesthetic plan, risks, benefits, and alternatives have been provided, discussed and informed consent has been obtained with: patient, spouse/significant other and child.        CODE STATUS:

## 2023-02-03 NOTE — H&P
Pre Procedure History & Physical    Chief Complaint:   Nausea  Rectal bleeding  Altered bowel habits    Subjective     HPI:   As above    Past Medical History:   Past Medical History:   Diagnosis Date   • Colon polyps    • GERD (gastroesophageal reflux disease)    • Migraine    • Pelvic pain        Past Surgical History:  Past Surgical History:   Procedure Laterality Date   •  SECTION     • CHOLECYSTECTOMY     • COLONOSCOPY     • D & C HYSTEROSCOPY ENDOMETRIAL ABLATION N/A 2021    Procedure: DILATATION AND CURETTAGE HYSTEROSCOPY NOVASURE ENDOMETRIAL ABLATION;  Surgeon: Jeferson Stark MD;  Location: [facility] MAIN OR;  Service: Gynecology;  Laterality: N/A;   • ENDOSCOPY     • SALPINGECTOMY Bilateral 2021    Procedure: LYSIS OF ADHESIONS, SALPINGECTOMY LAPAROSCOPIC;  Surgeon: Jeferson Stark MD;  Location: [facility] MAIN OR;  Service: Gynecology;  Laterality: Bilateral;   • TOTAL LAPAROSCOPIC HYSTERECTOMY N/A 2021    Procedure: TOTAL LAPAROSCOPIC HYSTERECTOMY; LYSIS OF ADHESIONS; CYSTOSCOPY;  Surgeon: Jeferson Stark MD;  Location: [facility] MAIN OR;  Service: Gynecology;  Laterality: N/A;   • TUBAL ABDOMINAL LIGATION         Family History:  Family History   Problem Relation Age of Onset   • Breast cancer Paternal Grandmother    • Colon cancer Maternal Grandfather    • Malig Hyperthermia Neg Hx        Social History:   reports that she has been smoking cigarettes. She has been smoking an average of .5 packs per day. She has never used smokeless tobacco. She reports that she does not drink alcohol and does not use drugs.    Medications:   Medications Prior to Admission   Medication Sig Dispense Refill Last Dose   • cetirizine-pseudoephedrine (ZyrTEC-D) 5-120 MG per 12 hr tablet Take 1 tablet by mouth 2 (Two) Times a Day As Needed.      • Diclofenac Sodium (VOLTAREN) 1 % gel gel Apply 4 g topically to the appropriate area as directed 4 (Four) Times a Day. 200 g 1    • esomeprazole  (nexIUM) 40 MG capsule Take 1 capsule by mouth Daily. 30 capsule 2    • hyoscyamine (ANASPAZ,LEVSIN) 0.125 MG tablet Take 1 tablet by mouth Every 4 (Four) Hours As Needed for Cramping. 90 tablet 2    • ibuprofen (ADVIL,MOTRIN) 600 MG tablet Take 1 tablet by mouth Every 6 (Six) Hours As Needed for Mild Pain . 60 tablet 1    • ondansetron (Zofran) 4 MG tablet Take 1 tablet by mouth Every 8 (Eight) Hours As Needed for Nausea or Vomiting. 30 tablet 1    • SUMAtriptan (IMITREX) 50 MG tablet Take 50 mg by mouth Daily As Needed.          Allergies:  Morphine        Objective     Blood pressure 120/72, pulse 72, temperature 98.2 °F (36.8 °C), temperature source Temporal, resp. rate 16, weight 61.4 kg (135 lb 5.8 oz), SpO2 99 %, not currently breastfeeding.    Physical Exam   Constitutional: Pt is oriented to person, place, and time and well-developed, well-nourished, and in no distress.   Mouth/Throat: Oropharynx is clear and moist.   Neck: Normal range of motion.   Cardiovascular: Normal rate, regular rhythm and normal heart sounds.    Pulmonary/Chest: Effort normal and breath sounds normal.   Abdominal: Soft. Nontender  Skin: Skin is warm and dry.   Psychiatric: Mood, memory, affect and judgment normal.     Assessment & Plan     Diagnosis:  Nausea  Rectal bleeding  Altered bowel habits      Anticipated Surgical Procedure:  egd  colonoscopy    The risks, benefits, and alternatives of this procedure have been discussed with the patient or the responsible party- the patient understands and agrees to proceed.

## 2023-02-03 NOTE — ANESTHESIA POSTPROCEDURE EVALUATION
Patient: Mary Patel    Procedure Summary     Date: 02/03/23 Room / Location: [facility] ENDOSCOPY 2 / [facility] ENDOSCOPY    Anesthesia Start: 1514 Anesthesia Stop: 1554    Procedures:       ESOPHAGOGASTRODUODENOSCOPY WITH BIOPSIES      COLONOSCOPY Diagnosis:       Nausea and vomiting, unspecified vomiting type      Nausea      Personal history of colonic polyps      Rectal bleeding      Epigastric pain      Altered bowel habits      (Nausea and vomiting, unspecified vomiting type [R11.2])      (Nausea [R11.0])      (Personal history of colonic polyps [Z86.010])      (Rectal bleeding [K62.5])      (Epigastric pain [R10.13])      (Altered bowel habits [R19.4])    Surgeons: Jose Carlos Singh MD Provider: Satish Gibson MD    Anesthesia Type: general ASA Status: 2          Anesthesia Type: general    Vitals  Vitals Value Taken Time   /69 02/03/23 1607   Temp 36.1 °C (97 °F) 02/03/23 1607   Pulse 49 02/03/23 1607   Resp 15 02/03/23 1607   SpO2 99 % 02/03/23 1607           Post Anesthesia Care and Evaluation    Patient location during evaluation: bedside  Patient participation: complete - patient participated  Level of consciousness: awake  Pain management: adequate    Airway patency: patent  Anesthetic complications: No anesthetic complications  PONV Status: none  Cardiovascular status: acceptable and stable  Respiratory status: acceptable  Hydration status: acceptable    Comments: An Anesthesiologist personally participated in the most demanding procedures (including induction and emergence if applicable) in the anesthesia plan, monitored the course of anesthesia administration at frequent intervals and remained physically present and available for immediate diagnosis and treatment of emergencies.

## 2023-02-07 RX ORDER — BISMUTH SUBCITRATE POTASSIUM, METRONIDAZOLE, TETRACYCLINE HYDROCHLORIDE 140; 125; 125 MG/1; MG/1; MG/1
3 CAPSULE ORAL
Qty: 120 CAPSULE | Refills: 0 | Status: SHIPPED | OUTPATIENT
Start: 2023-02-07 | End: 2023-02-17

## 2023-02-08 ENCOUNTER — TELEPHONE (OUTPATIENT)
Dept: GASTROENTEROLOGY | Facility: CLINIC | Age: 38
End: 2023-02-08
Payer: COMMERCIAL

## 2023-02-08 NOTE — TELEPHONE ENCOUNTER
----- Message from ESME Melgar sent at 2/7/2023  8:41 AM EST -----  Please call the patient and inform that they are positive for H. pylori.  H. pylori is overgrowth of bacteria within the stomach.  We treat this infection with antibiotics and antacids.  Educated the patient that I have called in medication to the pharmacy to treat H. pylori.  We will repeat a H. pylori breath test in 8 weeks.  Educated the patient that the breath test will need to be performed once the medication is completed and the patient will need to be off antacids for 2 weeks prior to the exam.  The breath test has been ordered and the patient can get the test performed at the hospital.  Patient cannot have anything to eat or drink at least 1 hour prior to the test.

## 2023-04-24 ENCOUNTER — TELEPHONE (OUTPATIENT)
Dept: GASTROENTEROLOGY | Facility: CLINIC | Age: 38
End: 2023-04-24
Payer: COMMERCIAL

## 2023-04-24 DIAGNOSIS — B96.81 HELICOBACTER PYLORI GASTRITIS: Primary | ICD-10-CM

## 2023-04-24 DIAGNOSIS — K29.70 HELICOBACTER PYLORI GASTRITIS: Primary | ICD-10-CM

## 2023-04-24 RX ORDER — OMEPRAZOLE 20 MG/1
20 CAPSULE, DELAYED RELEASE ORAL 2 TIMES DAILY
Qty: 20 CAPSULE | Refills: 0 | Status: SHIPPED | OUTPATIENT
Start: 2023-04-24 | End: 2023-05-04

## 2023-04-24 RX ORDER — AMOXICILLIN 500 MG/1
500 CAPSULE ORAL 2 TIMES DAILY
Qty: 20 CAPSULE | Refills: 0 | Status: SHIPPED | OUTPATIENT
Start: 2023-04-24 | End: 2023-05-04

## 2023-04-24 RX ORDER — CLARITHROMYCIN 500 MG/1
500 TABLET, COATED ORAL 2 TIMES DAILY
Qty: 20 TABLET | Refills: 0 | Status: SHIPPED | OUTPATIENT
Start: 2023-04-24 | End: 2023-05-04

## 2023-04-24 NOTE — TELEPHONE ENCOUNTER
Patients insurance does not cover pylera. Sending the breakdown of 3 medications to the pharmacy.

## 2023-05-04 ENCOUNTER — TELEPHONE (OUTPATIENT)
Dept: GASTROENTEROLOGY | Facility: CLINIC | Age: 38
End: 2023-05-04

## 2023-05-04 DIAGNOSIS — B37.9 YEAST INFECTION: Primary | ICD-10-CM

## 2023-05-04 RX ORDER — FLUCONAZOLE 100 MG/1
100 TABLET ORAL DAILY
Qty: 3 TABLET | Refills: 0 | Status: SHIPPED | OUTPATIENT
Start: 2023-05-04 | End: 2023-05-07

## 2023-05-04 NOTE — TELEPHONE ENCOUNTER
Patient came by the office stating she is almost finished with her H Pylori antibiotics, but it has caused her to have a yeast infection. Per Erna, OK to prescribe Diflucan. I advised patient to take the full three days worth and if symptoms do not improve, contact the office. Pt expressed understanding.

## 2023-06-06 ENCOUNTER — TELEPHONE (OUTPATIENT)
Dept: GASTROENTEROLOGY | Facility: CLINIC | Age: 38
End: 2023-06-06
Payer: COMMERCIAL

## 2023-06-06 DIAGNOSIS — K29.70 HELICOBACTER PYLORI GASTRITIS: Primary | ICD-10-CM

## 2023-06-06 DIAGNOSIS — B96.81 HELICOBACTER PYLORI GASTRITIS: Primary | ICD-10-CM

## 2023-06-06 NOTE — TELEPHONE ENCOUNTER
Patient called to inform the office that it was time for her repeat H Pylori test.  Please place the order in epic and contact the patient with further instructions.   negative

## 2023-10-27 ENCOUNTER — LAB (OUTPATIENT)
Dept: LAB | Facility: HOSPITAL | Age: 38
End: 2023-10-27
Payer: COMMERCIAL

## 2023-10-27 DIAGNOSIS — B96.81 HELICOBACTER PYLORI GASTRITIS: ICD-10-CM

## 2023-10-27 DIAGNOSIS — K29.70 HELICOBACTER PYLORI GASTRITIS: ICD-10-CM

## 2023-10-27 PROCEDURE — 83013 H PYLORI (C-13) BREATH: CPT

## 2023-10-28 LAB — UREA BREATH TEST QL: NEGATIVE

## (undated) DEVICE — Device

## (undated) DEVICE — GLV SURG SENSICARE ORTHO PF LF 7 STRL

## (undated) DEVICE — INTENDED FOR TISSUE SEPARATION, AND OTHER PROCEDURES THAT REQUIRE A SHARP SURGICAL BLADE TO PUNCTURE OR CUT.: Brand: BARD-PARKER ® CARBON RIB-BACK BLADES

## (undated) DEVICE — BLCK/BITE BLOX WO/DENTL/RIM W/STRAP 54F

## (undated) DEVICE — ENDOPATH PNEUMONEEDLE INSUFFLATION NEEDLES WITH LUER LOCK CONNECTORS 120MM: Brand: ENDOPATH

## (undated) DEVICE — SYR LL TP 10ML STRL

## (undated) DEVICE — APPL CHLORAPREP HI/LITE 26ML ORNG

## (undated) DEVICE — Device: Brand: DEFENDO AIR/WATER/SUCTION AND BIOPSY VALVE

## (undated) DEVICE — LAPAROSCOPIC SCISSORS: Brand: EPIX LAPAROSCOPIC SCISSORS

## (undated) DEVICE — GLV SURG BIOGEL LTX PF 7

## (undated) DEVICE — PAD GRND REM POLYHESIVE A/ DISP

## (undated) DEVICE — LAPAROSCOPIC TROCAR SLEEVE/SINGLE USE: Brand: KII® OPTICAL ACCESS SYSTEM

## (undated) DEVICE — SYR LUERLOK 50ML

## (undated) DEVICE — TRY PREP SCRB VAG PVP

## (undated) DEVICE — MANIP UTER RUMI 2 KOH EFFICIENT SS CP 3.5CM

## (undated) DEVICE — SUT MNCRYL PLS ANTIB UD 4/0 PS2 18IN

## (undated) DEVICE — 3M™ STERI-STRIP™ REINFORCED ADHESIVE SKIN CLOSURES, R1547, 1/2 IN X 4 IN (12 MM X 100 MM), 6 STRIPS/ENVELOPE: Brand: 3M™ STERI-STRIP™

## (undated) DEVICE — ENDOPATH XCEL WITH OPTIVIEW TECHNOLOGY UNIVERSAL TROCAR STABILITY SLEEVES: Brand: ENDOPATH XCEL OPTIVIEW

## (undated) DEVICE — KT ANTI FOG W/FLD AND SPNG

## (undated) DEVICE — SOLIDIFIER LIQLOC PLS 1500CC BT

## (undated) DEVICE — 1000ML,PRESSURE INFUSER W/STOPCOCK: Brand: MEDLINE

## (undated) DEVICE — ENDOPATH XCEL DILATING TIP TROCARS WITH STABILITY SLEEVES: Brand: ENDOPATH XCEL

## (undated) DEVICE — ANTIBACTERIAL VIOLET BRAIDED (POLYGLACTIN 910), SYNTHETIC ABSORBABLE SUTURE: Brand: COATED VICRYL

## (undated) DEVICE — SLV SCD LEG COMFORT KENDALLSCD MD REPROC

## (undated) DEVICE — GOWN,NON-REINFORCED,SIRUS,SET IN SLV,XXL: Brand: MEDLINE

## (undated) DEVICE — SOL NACL 0.9PCT 1000ML

## (undated) DEVICE — GLV SURG BIOGEL LTX PF 7 1/2

## (undated) DEVICE — ADHS LIQ MASTISOL 2/3ML

## (undated) DEVICE — SOL IRRG H2O BG 3000ML STRL

## (undated) DEVICE — LINER SURG CANSTR SXN S/RIGD 1500CC

## (undated) DEVICE — GYN LAPAROSCOPY-LF: Brand: MEDLINE INDUSTRIES, INC.

## (undated) DEVICE — TOTAL TRAY, 16FR 10ML SIL FOLEY, URN: Brand: MEDLINE

## (undated) DEVICE — PCH SURG INST LAP 2 LF

## (undated) DEVICE — NDL HYPO ECLPS SFTY 22G 1 1/2IN

## (undated) DEVICE — GOWN,REINFRCE,POLY,SIRUS,BREATH SLV,XXLG: Brand: MEDLINE

## (undated) DEVICE — CONN JET HYDRA H20 AUXILIARY DISP

## (undated) DEVICE — CATH URETH ALLPURP STR RUBBR 16F RD

## (undated) DEVICE — SOL IRR NACL 0.9PCT BT 1000ML

## (undated) DEVICE — SYS CLOSE PORTII CARTR/THOMASN XL

## (undated) DEVICE — ENDOPATH XCEL WITH OPTIVIEW TECHNOLOGY BLADELESS TROCARS WITH STABILITY SLEEVES: Brand: ENDOPATH XCEL OPTIVIEW

## (undated) DEVICE — SINGLE-USE BIOPSY FORCEPS: Brand: RADIAL JAW 4

## (undated) DEVICE — PROB ABL ENDOMTRL NOVASURE/G4 W/SURESND

## (undated) DEVICE — LITHOTOMY-YELLOW FINS: Brand: MEDLINE INDUSTRIES, INC.

## (undated) DEVICE — SUT VIC PLS CTD BR 0 TIE 18IN VIL

## (undated) DEVICE — SOL IRRG H2O PL/BG 1000ML STRL

## (undated) DEVICE — MARYLAND JAW LAPAROSCOPIC SEALER/DIVIDER COATED: Brand: LIGASURE

## (undated) DEVICE — COVADERM PLUS: Brand: DEROYAL

## (undated) DEVICE — HARMONIC ACE +7 LAPAROSCOPIC SHEARS ADVANCED HEMOSTASIS 5MM DIAMETER 36CM SHAFT LENGTH  FOR USE WITH GRAY HAND PIECE ONLY: Brand: HARMONIC ACE

## (undated) DEVICE — MANIP UTER RUMI TP 6.7MMX8CM BLU

## (undated) DEVICE — TROCAR: Brand: KII OPTICAL ACCESS SYSTEM

## (undated) DEVICE — LAPAROSCOPIC DISSECTOR: Brand: DEROYAL

## (undated) DEVICE — DRAPE,LAPAROTOMY,PCH,STERILE: Brand: MEDLINE

## (undated) DEVICE — LAPAROSCOPIC SMOKE EVACUATION SYSTEM ACTIVE AND PASSIVE: Brand: VALLEYLAB

## (undated) DEVICE — ENDOPATH XCEL BLADELESS TROCARS WITH STABILITY SLEEVES: Brand: ENDOPATH XCEL

## (undated) DEVICE — TBG INSUFFLATION W/CPC CONNEC: Brand: MEDLINE INDUSTRIES, INC.

## (undated) DEVICE — DUAL LUMEN STOMACH TUBE,ANTI-REFLUX VALVE: Brand: SALEM SUMP